# Patient Record
Sex: MALE | Race: WHITE | NOT HISPANIC OR LATINO | URBAN - METROPOLITAN AREA
[De-identification: names, ages, dates, MRNs, and addresses within clinical notes are randomized per-mention and may not be internally consistent; named-entity substitution may affect disease eponyms.]

---

## 2017-02-19 ENCOUNTER — CHARTING TRANS (OUTPATIENT)
Dept: URGENT CARE | Age: 32
End: 2017-02-19

## 2017-02-19 ENCOUNTER — MYAURORA ACCOUNT LINK (OUTPATIENT)
Dept: OTHER | Age: 32
End: 2017-02-19

## 2017-02-19 ASSESSMENT — PAIN SCALES - GENERAL: PAINLEVEL_OUTOF10: 9

## 2018-11-23 ENCOUNTER — IMAGING SERVICES (OUTPATIENT)
Dept: OTHER | Age: 33
End: 2018-11-23

## 2018-11-29 VITALS
SYSTOLIC BLOOD PRESSURE: 122 MMHG | DIASTOLIC BLOOD PRESSURE: 76 MMHG | HEART RATE: 96 BPM | RESPIRATION RATE: 16 BRPM | TEMPERATURE: 97.7 F

## 2019-01-16 ENCOUNTER — OFFICE VISIT (OUTPATIENT)
Dept: SURGERY | Facility: CLINIC | Age: 34
End: 2019-01-16
Payer: COMMERCIAL

## 2019-01-16 VITALS
WEIGHT: 168 LBS | SYSTOLIC BLOOD PRESSURE: 109 MMHG | DIASTOLIC BLOOD PRESSURE: 71 MMHG | HEIGHT: 67 IN | BODY MASS INDEX: 26.37 KG/M2 | HEART RATE: 99 BPM

## 2019-01-16 DIAGNOSIS — Z31.69 INFERTILITY COUNSELING: Primary | ICD-10-CM

## 2019-01-16 PROCEDURE — 99204 OFFICE O/P NEW MOD 45 MIN: CPT | Performed by: UROLOGY

## 2019-01-16 PROCEDURE — 99212 OFFICE O/P EST SF 10 MIN: CPT | Performed by: UROLOGY

## 2019-01-16 NOTE — PROGRESS NOTES
[facility] (formerly [facility]), [facility] Urology  Initial Office Consultation    HPI:   Keith Kleley is a 35year old male here today for further evaluation and to rule out male factor infertility. He is here by himself. He is self-referred.     Patient has been  to hi socially. He works as a  with [email or web address] He denies any substance abuse. Family Hx: Patient denies any family history of prostate cancer or urological malignancy. Both his parents are alive and healthy.     Past Medical History:   Diagnosis Right testis shows no mass and no tenderness. Left testis shows no mass and no tenderness. Circumcised. No hypospadias. Penis exhibits no lesions. Genitourinary Comments: Normal male pubic hair distribution. Meatus orthotopic.  Bilateral testicles descen

## 2019-03-25 ENCOUNTER — TELEPHONE (OUTPATIENT)
Dept: SURGERY | Facility: CLINIC | Age: 34
End: 2019-03-25

## 2019-04-20 ENCOUNTER — HOSPITAL ENCOUNTER (OUTPATIENT)
Age: 34
Discharge: HOME | End: 2019-04-20
Payer: COMMERCIAL

## 2019-04-20 VITALS
BODY MASS INDEX: 25.71 KG/M2 | SYSTOLIC BLOOD PRESSURE: 107 MMHG | TEMPERATURE: 98 F | HEART RATE: 85 BPM | WEIGHT: 160 LBS | RESPIRATION RATE: 18 BRPM | OXYGEN SATURATION: 98 % | DIASTOLIC BLOOD PRESSURE: 72 MMHG | HEIGHT: 66 IN

## 2019-04-20 DIAGNOSIS — M10.9 ACUTE GOUT INVOLVING TOE OF LEFT FOOT, UNSPECIFIED CAUSE: Primary | ICD-10-CM

## 2019-04-20 PROCEDURE — 99214 OFFICE O/P EST MOD 30 MIN: CPT

## 2019-04-20 PROCEDURE — 99213 OFFICE O/P EST LOW 20 MIN: CPT

## 2019-04-20 RX ORDER — METHYLPREDNISOLONE 4 MG/1
TABLET ORAL
Qty: 1 PACKAGE | Refills: 0 | Status: SHIPPED | OUTPATIENT
Start: 2019-04-20 | End: 2019-04-25

## 2019-04-20 NOTE — ED PROVIDER NOTES
Patient presents with:  Swelling Edema (cardiovascular, metabolic)      HPI:     Alexa Nieto is a 35year old male who presents today with a chief complaint of pain in the base of the left great toe that he woke up with this morning.   He states he has Not on file        Attends meetings of clubs or organizations: Not on file        Relationship status: Not on file      Intimate partner violence:        Fear of current or ex partner: Not on file        Emotionally abused: Not on file        Physically ab primary care doctor. Diagnosis:    ICD-10-CM    1. Acute gout involving toe of left foot, unspecified cause M10.9        All results reviewed and discussed with patient. See AVS for detailed discharge instructions for your condition today.     Follow Up

## 2019-11-16 ENCOUNTER — APPOINTMENT (OUTPATIENT)
Dept: LAB | Facility: HOSPITAL | Age: 34
End: 2019-11-16
Attending: UROLOGY
Payer: COMMERCIAL

## 2019-11-16 DIAGNOSIS — Z31.69 INFERTILITY COUNSELING: ICD-10-CM

## 2019-11-16 PROCEDURE — 89320 SEMEN ANAL VOL/COUNT/MOT: CPT

## 2019-11-25 ENCOUNTER — PATIENT MESSAGE (OUTPATIENT)
Dept: SURGERY | Facility: CLINIC | Age: 34
End: 2019-11-25

## 2019-11-26 NOTE — TELEPHONE ENCOUNTER
From: Serafin Clemens  To: Arsh Ramos MD  Sent: 11/25/2019 8:08 AM CST  Subject: Test Results Question    I was wondering if you could fax the test results and findings to the fertility clinic that me and my wife have set up for December 3rd, it is

## 2020-01-07 ENCOUNTER — WALK IN (OUTPATIENT)
Dept: URGENT CARE | Age: 35
End: 2020-01-07

## 2020-01-07 DIAGNOSIS — M10.9 ACUTE GOUT OF RIGHT FOOT, UNSPECIFIED CAUSE: Primary | ICD-10-CM

## 2020-01-07 PROCEDURE — 99213 OFFICE O/P EST LOW 20 MIN: CPT | Performed by: FAMILY MEDICINE

## 2020-01-07 RX ORDER — DIPHENHYDRAMINE HCL 25 MG
TABLET ORAL
COMMUNITY

## 2020-01-07 RX ORDER — PREDNISONE 20 MG/1
40 TABLET ORAL DAILY
Qty: 10 TABLET | Refills: 0 | Status: SHIPPED | OUTPATIENT
Start: 2020-01-07 | End: 2020-01-12

## 2020-01-07 ASSESSMENT — PAIN SCALES - GENERAL: PAINLEVEL: 5-6

## 2020-01-11 ENCOUNTER — HOSPITAL (OUTPATIENT)
Dept: OTHER | Age: 35
End: 2020-01-11

## 2020-02-03 ENCOUNTER — HOSPITAL (OUTPATIENT)
Dept: OTHER | Age: 35
End: 2020-02-03
Attending: ORTHOPAEDIC SURGERY

## 2020-02-03 LAB
ANALYZER ANC (IANC): NORMAL
ANION GAP SERPL CALC-SCNC: 7 MMOL/L (ref 10–20)
APTT PPP: 31 SEC (ref 22–32)
APTT PPP: NORMAL S
APTT PPP: NORMAL S
BUN SERPL-MCNC: 13 MG/DL (ref 6–20)
BUN/CREAT SERPL: 11 (ref 7–25)
CALCIUM SERPL-MCNC: 9.2 MG/DL (ref 8.4–10.2)
CHLORIDE SERPL-SCNC: 107 MMOL/L (ref 98–107)
CO2 SERPL-SCNC: 28 MMOL/L (ref 21–32)
CREAT SERPL-MCNC: 1.21 MG/DL (ref 0.67–1.17)
ERYTHROCYTE [DISTWIDTH] IN BLOOD: 13.2 % (ref 11–15)
GLUCOSE SERPL-MCNC: 85 MG/DL (ref 65–99)
HCT VFR BLD CALC: 42.1 % (ref 39–51)
HGB BLD-MCNC: 13.8 G/DL (ref 13–17)
INR PPP: 1
INR PPP: NORMAL
LENGTH OF FAST TIME PATIENT: 4 HRS
MCH RBC QN AUTO: 29.4 PG (ref 26–34)
MCHC RBC AUTO-ENTMCNC: 32.8 G/DL (ref 32–36.5)
MCV RBC AUTO: 89.8 FL (ref 78–100)
NRBC (NRBCRE): 0 /100 WBC
PLATELET # BLD: 178 K/MCL (ref 140–450)
POTASSIUM SERPL-SCNC: 4 MMOL/L (ref 3.4–5.1)
PROTHROMBIN TIME (PRT2): NORMAL
PROTHROMBIN TIME: 10.1 SEC (ref 9.7–11.8)
RBC # BLD: 4.69 MIL/MCL (ref 4.5–5.9)
SODIUM SERPL-SCNC: 138 MMOL/L (ref 135–145)
WBC # BLD: 4.7 K/MCL (ref 4.2–11)

## 2020-11-05 ENCOUNTER — PATIENT MESSAGE (OUTPATIENT)
Dept: SURGERY | Facility: CLINIC | Age: 35
End: 2020-11-05

## 2020-11-05 DIAGNOSIS — N46.9 INFERTILITY MALE: Primary | ICD-10-CM

## 2020-11-07 NOTE — TELEPHONE ENCOUNTER
From: Sha Casey  To: Marcy Malik MD  Sent: 11/5/2020 7:17 PM CST  Subject: Other    I was wondering if I could get in sooner, I need a new analysis at an earlier date?  Our fertility center needs it asap, it doesn't matter if it is with someone

## 2020-11-20 ENCOUNTER — OFFICE VISIT (OUTPATIENT)
Dept: INTERNAL MEDICINE CLINIC | Facility: CLINIC | Age: 35
End: 2020-11-20
Payer: COMMERCIAL

## 2020-11-20 VITALS
SYSTOLIC BLOOD PRESSURE: 128 MMHG | DIASTOLIC BLOOD PRESSURE: 70 MMHG | BODY MASS INDEX: 26.43 KG/M2 | HEIGHT: 67.48 IN | WEIGHT: 170.38 LBS | HEART RATE: 86 BPM | TEMPERATURE: 99 F | RESPIRATION RATE: 16 BRPM

## 2020-11-20 DIAGNOSIS — E55.9 VITAMIN D DEFICIENCY: ICD-10-CM

## 2020-11-20 DIAGNOSIS — Z13.228 SCREENING FOR METABOLIC DISORDER: ICD-10-CM

## 2020-11-20 DIAGNOSIS — Z00.00 ANNUAL PHYSICAL EXAM: Primary | ICD-10-CM

## 2020-11-20 DIAGNOSIS — Z13.0 SCREENING FOR BLOOD DISEASE: ICD-10-CM

## 2020-11-20 DIAGNOSIS — Z13.29 THYROID DISORDER SCREENING: ICD-10-CM

## 2020-11-20 DIAGNOSIS — Z13.220 SCREENING FOR LIPID DISORDERS: ICD-10-CM

## 2020-11-20 PROCEDURE — 3008F BODY MASS INDEX DOCD: CPT | Performed by: INTERNAL MEDICINE

## 2020-11-20 PROCEDURE — 3078F DIAST BP <80 MM HG: CPT | Performed by: INTERNAL MEDICINE

## 2020-11-20 PROCEDURE — 3074F SYST BP LT 130 MM HG: CPT | Performed by: INTERNAL MEDICINE

## 2020-11-20 PROCEDURE — 99072 ADDL SUPL MATRL&STAF TM PHE: CPT | Performed by: INTERNAL MEDICINE

## 2020-11-20 PROCEDURE — 99385 PREV VISIT NEW AGE 18-39: CPT | Performed by: INTERNAL MEDICINE

## 2020-11-20 RX ORDER — NAPROXEN SODIUM 220 MG
TABLET ORAL
COMMUNITY
Start: 2019-12-01

## 2020-11-20 NOTE — PROGRESS NOTES
Rafael Grady  4/21/1985    Patient presents with:  Establish Care: BM RM 7   Other: Patient would like to go back on ADHD medication      HPI:   Rafael Rasmussen is a 28year old male who presents for an annual physical examination.     The patient has abdominal pain  NEURO: denies headaches    EXAM:   /70   Pulse 86   Temp 98.7 °F (37.1 °C)   Resp 16   Ht 67.48\"   Wt 170 lb 6.4 oz (77.3 kg)   BMI 26.31 kg/m²   GENERAL: Well developed, well nourished,in no apparent distress  SKIN: No rashes,no dinah

## 2021-01-05 ENCOUNTER — NURSE ONLY (OUTPATIENT)
Dept: LAB | Facility: HOSPITAL | Age: 36
End: 2021-01-05
Attending: UROLOGY
Payer: COMMERCIAL

## 2021-01-05 DIAGNOSIS — Z13.220 SCREENING FOR LIPID DISORDERS: ICD-10-CM

## 2021-01-05 DIAGNOSIS — Z13.228 SCREENING FOR METABOLIC DISORDER: ICD-10-CM

## 2021-01-05 DIAGNOSIS — E55.9 VITAMIN D DEFICIENCY: ICD-10-CM

## 2021-01-05 DIAGNOSIS — Z00.00 ANNUAL PHYSICAL EXAM: ICD-10-CM

## 2021-01-05 DIAGNOSIS — N46.9 INFERTILITY MALE: ICD-10-CM

## 2021-01-05 DIAGNOSIS — R73.01 ELEVATED FASTING BLOOD SUGAR: ICD-10-CM

## 2021-01-05 DIAGNOSIS — Z13.29 THYROID DISORDER SCREENING: ICD-10-CM

## 2021-01-05 DIAGNOSIS — Z13.0 SCREENING FOR BLOOD DISEASE: ICD-10-CM

## 2021-01-05 LAB
ALBUMIN SERPL-MCNC: 4.1 G/DL (ref 3.4–5)
ALBUMIN/GLOB SERPL: 1.1 {RATIO} (ref 1–2)
ALP LIVER SERPL-CCNC: 70 U/L
ALT SERPL-CCNC: 38 U/L
ANION GAP SERPL CALC-SCNC: 5 MMOL/L (ref 0–18)
AST SERPL-CCNC: 20 U/L (ref 15–37)
BASOPHILS # BLD AUTO: 0.05 X10(3) UL (ref 0–0.2)
BASOPHILS NFR BLD AUTO: 0.9 %
BILIRUB SERPL-MCNC: 0.4 MG/DL (ref 0.1–2)
BUN BLD-MCNC: 17 MG/DL (ref 7–18)
BUN/CREAT SERPL: 12.6 (ref 10–20)
CALCIUM BLD-MCNC: 9.5 MG/DL (ref 8.5–10.1)
CHLORIDE SERPL-SCNC: 107 MMOL/L (ref 98–112)
CHOLEST SMN-MCNC: 199 MG/DL (ref ?–200)
CO2 SERPL-SCNC: 27 MMOL/L (ref 21–32)
CREAT BLD-MCNC: 1.35 MG/DL
DEPRECATED RDW RBC AUTO: 43.3 FL (ref 35.1–46.3)
EOSINOPHIL # BLD AUTO: 0.24 X10(3) UL (ref 0–0.7)
EOSINOPHIL NFR BLD AUTO: 4.1 %
ERYTHROCYTE [DISTWIDTH] IN BLOOD BY AUTOMATED COUNT: 13.2 % (ref 11–15)
EST. AVERAGE GLUCOSE BLD GHB EST-MCNC: 103 MG/DL (ref 68–126)
GLOBULIN PLAS-MCNC: 3.6 G/DL (ref 2.8–4.4)
GLUCOSE BLD-MCNC: 119 MG/DL (ref 70–99)
HBA1C MFR BLD HPLC: 5.2 % (ref ?–5.7)
HCT VFR BLD AUTO: 45.1 %
HDLC SERPL-MCNC: 40 MG/DL (ref 40–59)
HGB BLD-MCNC: 15.1 G/DL
IMM GRANULOCYTES # BLD AUTO: 0.01 X10(3) UL (ref 0–1)
IMM GRANULOCYTES NFR BLD: 0.2 %
LDLC SERPL CALC-MCNC: 132 MG/DL (ref ?–100)
LYMPHOCYTES # BLD AUTO: 1.46 X10(3) UL (ref 1–4)
LYMPHOCYTES NFR BLD AUTO: 24.8 %
M PROTEIN MFR SERPL ELPH: 7.7 G/DL (ref 6.4–8.2)
MCH RBC QN AUTO: 29.9 PG (ref 26–34)
MCHC RBC AUTO-ENTMCNC: 33.5 G/DL (ref 31–37)
MCV RBC AUTO: 89.3 FL
MONOCYTES # BLD AUTO: 0.65 X10(3) UL (ref 0.1–1)
MONOCYTES NFR BLD AUTO: 11.1 %
NEUTROPHILS # BLD AUTO: 3.47 X10 (3) UL (ref 1.5–7.7)
NEUTROPHILS # BLD AUTO: 3.47 X10(3) UL (ref 1.5–7.7)
NEUTROPHILS NFR BLD AUTO: 58.9 %
NONHDLC SERPL-MCNC: 159 MG/DL (ref ?–130)
OSMOLALITY SERPL CALC.SUM OF ELEC: 291 MOSM/KG (ref 275–295)
PATIENT FASTING Y/N/NP: YES
PATIENT FASTING Y/N/NP: YES
PH SMN: 7.2 [PH] (ref 7.2–8.3)
PLATELET # BLD AUTO: 168 10(3)UL (ref 150–450)
POTASSIUM SERPL-SCNC: 4 MMOL/L (ref 3.5–5.1)
RBC # BLD AUTO: 5.05 X10(6)UL
SODIUM SERPL-SCNC: 139 MMOL/L (ref 136–145)
SPECIMEN VOL SMN: 1.5 ML (ref 1.5–6)
SPERM # SMN: 231.6 MILL/ML (ref 15–150)
SPERM IMMOTILE NFR SMN: 46 %
SPERM IMMOTILE NFR SMN: 6 %
SPERM PROG NFR SMN: 48 %
TRIGL SERPL-MCNC: 133 MG/DL (ref 30–149)
TSI SER-ACNC: 1.42 MIU/ML (ref 0.36–3.74)
VLDLC SERPL CALC-MCNC: 27 MG/DL (ref 0–30)
WBC # BLD AUTO: 5.9 X10(3) UL (ref 4–11)

## 2021-01-05 PROCEDURE — 80061 LIPID PANEL: CPT

## 2021-01-05 PROCEDURE — 84443 ASSAY THYROID STIM HORMONE: CPT

## 2021-01-05 PROCEDURE — 89320 SEMEN ANAL VOL/COUNT/MOT: CPT

## 2021-01-05 PROCEDURE — 80053 COMPREHEN METABOLIC PANEL: CPT

## 2021-01-05 PROCEDURE — 82306 VITAMIN D 25 HYDROXY: CPT

## 2021-01-05 PROCEDURE — 85025 COMPLETE CBC W/AUTO DIFF WBC: CPT

## 2021-01-05 PROCEDURE — 36415 COLL VENOUS BLD VENIPUNCTURE: CPT

## 2021-01-05 PROCEDURE — 83036 HEMOGLOBIN GLYCOSYLATED A1C: CPT

## 2021-01-06 LAB — 25(OH)D3 SERPL-MCNC: 22 NG/ML (ref 30–100)

## 2021-03-26 RX ORDER — ERGOCALCIFEROL 1.25 MG/1
50000 CAPSULE ORAL WEEKLY
Qty: 12 CAPSULE | Refills: 0 | OUTPATIENT
Start: 2021-03-26 | End: 2021-06-12

## 2021-03-26 NOTE — TELEPHONE ENCOUNTER
Last Ov: 11/20/20, AD, CPE  Last labs: CBC, CMP, Lipid, TSH w Ref, Vit D, A1c 1/5/21  Last Rx: ergocalciferol 1.25mg, #12, 0R 1/6/21    No future appointments. Per Protocol - not on protocol, Rx pending.

## 2021-04-17 ENCOUNTER — IMMUNIZATION (OUTPATIENT)
Dept: LAB | Age: 36
End: 2021-04-17
Attending: HOSPITALIST
Payer: COMMERCIAL

## 2021-04-17 DIAGNOSIS — Z23 NEED FOR VACCINATION: Primary | ICD-10-CM

## 2021-04-17 PROCEDURE — 0001A SARSCOV2 VAC 30MCG/0.3ML IM: CPT

## 2021-05-08 ENCOUNTER — IMMUNIZATION (OUTPATIENT)
Dept: LAB | Age: 36
End: 2021-05-08
Attending: FAMILY MEDICINE
Payer: COMMERCIAL

## 2021-05-08 DIAGNOSIS — Z23 NEED FOR VACCINATION: Primary | ICD-10-CM

## 2021-05-08 PROCEDURE — 0002A SARSCOV2 VAC 30MCG/0.3ML IM: CPT

## 2021-05-25 VITALS
RESPIRATION RATE: 16 BRPM | HEART RATE: 78 BPM | SYSTOLIC BLOOD PRESSURE: 125 MMHG | OXYGEN SATURATION: 97 % | DIASTOLIC BLOOD PRESSURE: 73 MMHG | TEMPERATURE: 98.4 F

## 2022-01-09 ENCOUNTER — IMMUNIZATION (OUTPATIENT)
Dept: LAB | Facility: HOSPITAL | Age: 37
End: 2022-01-09
Attending: EMERGENCY MEDICINE
Payer: COMMERCIAL

## 2022-01-09 DIAGNOSIS — Z23 NEED FOR VACCINATION: Primary | ICD-10-CM

## 2022-01-09 PROCEDURE — 0054A SARSCOV2 VAC 30MCG/0.3ML IM: CPT

## 2022-01-09 PROCEDURE — 0004A SARSCOV2 VAC 30MCG/0.3ML IM: CPT

## 2022-01-24 ENCOUNTER — OFFICE VISIT (OUTPATIENT)
Dept: SURGERY | Facility: CLINIC | Age: 37
End: 2022-01-24
Payer: COMMERCIAL

## 2022-01-24 DIAGNOSIS — Z31.69 INFERTILITY COUNSELING: ICD-10-CM

## 2022-01-24 DIAGNOSIS — Z78.9 ATTEMPTING TO CONCEIVE: Primary | ICD-10-CM

## 2022-01-24 PROCEDURE — 99203 OFFICE O/P NEW LOW 30 MIN: CPT | Performed by: UROLOGY

## 2022-01-24 NOTE — PROGRESS NOTES
[facility], [facility] Urology  Initial Office Consultation    HPI:   Duane Finer is a 39year old male here today for consultation at the request of, and a copy of this note will be sent to, Ximena Smith MD.    Patient was previously seen by me on 1/16/2019 Problems Paternal Grandfather      Allergies: Penicillins      REVIEW OF SYSTEMS:  Pertinent positives and negatives per HPI. A 10-point ROS was performed and is otherwise negative. EXAM:  There were no vitals taken for this visit.     Physical Exam was evaluated utilizing the [facility] Classification. The normal morphology lower reference limit by this classification is 4%.      Component      Latest Ref Rng & Units 11/16/2019   Semen Volume     1.5 - 6.0 mL 2.5   SEMEN PH     7.2 - 8.3 7.2   Semen Liquefac

## 2022-02-18 PROBLEM — F90.2 ATTENTION DEFICIT HYPERACTIVITY DISORDER (ADHD), COMBINED TYPE: Status: ACTIVE | Noted: 2022-02-18

## 2022-06-14 PROBLEM — F40.10 SOCIAL ANXIETY DISORDER: Status: ACTIVE | Noted: 2022-06-14

## 2022-07-05 ENCOUNTER — OFFICE VISIT (OUTPATIENT)
Dept: INTERNAL MEDICINE CLINIC | Facility: CLINIC | Age: 37
End: 2022-07-05
Payer: COMMERCIAL

## 2022-07-05 VITALS
DIASTOLIC BLOOD PRESSURE: 68 MMHG | TEMPERATURE: 97 F | OXYGEN SATURATION: 96 % | HEIGHT: 67 IN | HEART RATE: 64 BPM | SYSTOLIC BLOOD PRESSURE: 114 MMHG | RESPIRATION RATE: 16 BRPM | BODY MASS INDEX: 25.11 KG/M2 | WEIGHT: 160 LBS

## 2022-07-05 DIAGNOSIS — M54.2 CERVICALGIA: ICD-10-CM

## 2022-07-05 DIAGNOSIS — Z00.00 LABORATORY EXAMINATION ORDERED AS PART OF A COMPLETE PHYSICAL EXAMINATION: ICD-10-CM

## 2022-07-05 DIAGNOSIS — M67.431 GANGLION CYST OF WRIST, RIGHT: ICD-10-CM

## 2022-07-05 DIAGNOSIS — R53.82 CHRONIC FATIGUE: ICD-10-CM

## 2022-07-05 DIAGNOSIS — Z13.0 SCREENING FOR BLOOD DISEASE: ICD-10-CM

## 2022-07-05 DIAGNOSIS — M25.50 ARTHRALGIA OF MULTIPLE JOINTS: ICD-10-CM

## 2022-07-05 DIAGNOSIS — J45.909 UNCOMPLICATED ASTHMA, UNSPECIFIED ASTHMA SEVERITY, UNSPECIFIED WHETHER PERSISTENT: Primary | ICD-10-CM

## 2022-07-05 DIAGNOSIS — Z13.228 SCREENING FOR METABOLIC DISORDER: ICD-10-CM

## 2022-07-05 DIAGNOSIS — Z13.220 SCREENING FOR LIPID DISORDERS: ICD-10-CM

## 2022-07-05 DIAGNOSIS — Z13.29 SCREENING FOR THYROID DISORDER: ICD-10-CM

## 2022-07-05 PROCEDURE — 3008F BODY MASS INDEX DOCD: CPT | Performed by: INTERNAL MEDICINE

## 2022-07-05 PROCEDURE — 99215 OFFICE O/P EST HI 40 MIN: CPT | Performed by: INTERNAL MEDICINE

## 2022-07-05 PROCEDURE — 3074F SYST BP LT 130 MM HG: CPT | Performed by: INTERNAL MEDICINE

## 2022-07-05 PROCEDURE — 3078F DIAST BP <80 MM HG: CPT | Performed by: INTERNAL MEDICINE

## 2022-07-05 RX ORDER — ALBUTEROL SULFATE 90 UG/1
2 AEROSOL, METERED RESPIRATORY (INHALATION) EVERY 6 HOURS PRN
Qty: 1 EACH | Refills: 1 | Status: SHIPPED | OUTPATIENT
Start: 2022-07-05 | End: 2022-07-11

## 2022-07-05 RX ORDER — FLUTICASONE PROPIONATE 110 UG/1
1 AEROSOL, METERED RESPIRATORY (INHALATION) 2 TIMES DAILY
Qty: 1 EACH | Refills: 0 | Status: SHIPPED | OUTPATIENT
Start: 2022-07-05 | End: 2022-07-11

## 2022-07-08 ENCOUNTER — PATIENT MESSAGE (OUTPATIENT)
Dept: INTERNAL MEDICINE CLINIC | Facility: CLINIC | Age: 37
End: 2022-07-08

## 2022-07-11 RX ORDER — DEXTROAMPHETAMINE SACCHARATE, AMPHETAMINE ASPARTATE, DEXTROAMPHETAMINE SULFATE AND AMPHETAMINE SULFATE 2.5; 2.5; 2.5; 2.5 MG/1; MG/1; MG/1; MG/1
10 TABLET ORAL DAILY
Qty: 30 TABLET | Refills: 0 | Status: SHIPPED | OUTPATIENT
Start: 2022-07-11 | End: 2022-08-10

## 2022-07-11 RX ORDER — FLUTICASONE PROPIONATE 110 UG/1
1 AEROSOL, METERED RESPIRATORY (INHALATION) 2 TIMES DAILY
Qty: 1 EACH | Refills: 0 | Status: SHIPPED | OUTPATIENT
Start: 2022-07-11 | End: 2023-07-06

## 2022-07-11 RX ORDER — DEXTROAMPHETAMINE SACCHARATE, AMPHETAMINE ASPARTATE MONOHYDRATE, DEXTROAMPHETAMINE SULFATE AND AMPHETAMINE SULFATE 6.25; 6.25; 6.25; 6.25 MG/1; MG/1; MG/1; MG/1
25 CAPSULE, EXTENDED RELEASE ORAL DAILY
Qty: 30 CAPSULE | Refills: 0 | Status: SHIPPED | OUTPATIENT
Start: 2022-07-19 | End: 2022-08-19

## 2022-07-11 RX ORDER — ALBUTEROL SULFATE 90 UG/1
2 AEROSOL, METERED RESPIRATORY (INHALATION) EVERY 6 HOURS PRN
Qty: 1 EACH | Refills: 1 | Status: SHIPPED | OUTPATIENT
Start: 2022-07-11

## 2022-07-11 NOTE — TELEPHONE ENCOUNTER
From: Viral Daily  To: Nghia Delgado MD  Sent: 7/8/2022 4:05 PM CDT  Subject: Refill    I think the person at [facility] made a mistake I wasn't trying to fill or refill the BUS. Just the ADHD and asthma medications. Thank you!

## 2022-07-11 NOTE — TELEPHONE ENCOUNTER
Last VISIT 07/05/22    Last CPE 11/20/20    Last REFILL 06/23/22   amphetamine-dextroamphetamine (ADDERALL) 10 MG Oral Tab 30 tablet 0     Amphetamine-Dextroamphet ER (ADDERALL XR) 25 MG Oral Capsule SR 24 Hr 30 capsule 0   07/05/22  fluticasone propionate (FLOVENT HFA) 110 MCG/ACT Inhalation Aerosol 1 each 0     albuterol 108 (90 Base) MCG/ACT Inhalation Aero Soln 1 each 1     Last LABS 01/05/21 Multiple labs done    No Future Appointments      Per PROTOCOL? Failed     Please Approve or Deny.

## 2022-08-03 PROBLEM — F43.21 GRIEF AT LOSS OF CHILD: Status: ACTIVE | Noted: 2022-08-03

## 2022-08-03 PROBLEM — Z63.4 GRIEF AT LOSS OF CHILD: Status: ACTIVE | Noted: 2022-08-03

## 2022-08-11 ENCOUNTER — HOSPITAL ENCOUNTER (OUTPATIENT)
Dept: GENERAL RADIOLOGY | Age: 37
Discharge: HOME | End: 2022-08-11
Attending: INTERNAL MEDICINE
Payer: COMMERCIAL

## 2022-08-11 DIAGNOSIS — M54.2 CERVICALGIA: ICD-10-CM

## 2022-08-11 PROCEDURE — 72040 X-RAY EXAM NECK SPINE 2-3 VW: CPT | Performed by: INTERNAL MEDICINE

## 2022-08-13 ENCOUNTER — LAB ENCOUNTER (OUTPATIENT)
Dept: LAB | Age: 37
End: 2022-08-13
Attending: INTERNAL MEDICINE
Payer: COMMERCIAL

## 2022-08-13 DIAGNOSIS — Z13.0 SCREENING FOR BLOOD DISEASE: ICD-10-CM

## 2022-08-13 DIAGNOSIS — M54.2 CERVICALGIA: ICD-10-CM

## 2022-08-13 DIAGNOSIS — Z00.00 LABORATORY EXAMINATION ORDERED AS PART OF A COMPLETE PHYSICAL EXAMINATION: ICD-10-CM

## 2022-08-13 DIAGNOSIS — Z13.228 SCREENING FOR METABOLIC DISORDER: ICD-10-CM

## 2022-08-13 DIAGNOSIS — Z13.220 SCREENING FOR LIPID DISORDERS: ICD-10-CM

## 2022-08-13 DIAGNOSIS — R53.82 CHRONIC FATIGUE: ICD-10-CM

## 2022-08-13 DIAGNOSIS — M25.50 ARTHRALGIA OF MULTIPLE JOINTS: ICD-10-CM

## 2022-08-13 DIAGNOSIS — Z13.29 SCREENING FOR THYROID DISORDER: ICD-10-CM

## 2022-08-13 LAB
ALBUMIN SERPL-MCNC: 4.3 G/DL (ref 3.4–5)
ALBUMIN/GLOB SERPL: 1.3 {RATIO} (ref 1–2)
ALP LIVER SERPL-CCNC: 72 U/L
ALT SERPL-CCNC: 45 U/L
ANION GAP SERPL CALC-SCNC: 6 MMOL/L (ref 0–18)
AST SERPL-CCNC: 28 U/L (ref 15–37)
BASOPHILS # BLD AUTO: 0.06 X10(3) UL (ref 0–0.2)
BASOPHILS NFR BLD AUTO: 1 %
BILIRUB SERPL-MCNC: 0.3 MG/DL (ref 0.1–2)
BUN BLD-MCNC: 17 MG/DL (ref 7–18)
BUN/CREAT SERPL: 12.5 (ref 10–20)
CALCIUM BLD-MCNC: 9.7 MG/DL (ref 8.5–10.1)
CHLORIDE SERPL-SCNC: 109 MMOL/L (ref 98–112)
CHOLEST SERPL-MCNC: 172 MG/DL (ref ?–200)
CO2 SERPL-SCNC: 25 MMOL/L (ref 21–32)
CREAT BLD-MCNC: 1.36 MG/DL
CRP SERPL-MCNC: <0.29 MG/DL (ref ?–0.3)
DEPRECATED RDW RBC AUTO: 48 FL (ref 35.1–46.3)
EOSINOPHIL # BLD AUTO: 0.14 X10(3) UL (ref 0–0.7)
EOSINOPHIL NFR BLD AUTO: 2.3 %
ERYTHROCYTE [DISTWIDTH] IN BLOOD BY AUTOMATED COUNT: 13.9 % (ref 11–15)
ERYTHROCYTE [SEDIMENTATION RATE] IN BLOOD: 9 MM/HR
FASTING PATIENT LIPID ANSWER: YES
FASTING STATUS PATIENT QL REPORTED: YES
GFR SERPLBLD BASED ON 1.73 SQ M-ARVRAT: 69 ML/MIN/1.73M2 (ref 60–?)
GLOBULIN PLAS-MCNC: 3.4 G/DL (ref 2.8–4.4)
GLUCOSE BLD-MCNC: 115 MG/DL (ref 70–99)
HCT VFR BLD AUTO: 45.2 %
HDLC SERPL-MCNC: 47 MG/DL (ref 40–59)
HGB BLD-MCNC: 14.6 G/DL
IMM GRANULOCYTES # BLD AUTO: 0.01 X10(3) UL (ref 0–1)
IMM GRANULOCYTES NFR BLD: 0.2 %
LDLC SERPL CALC-MCNC: 115 MG/DL (ref ?–100)
LYMPHOCYTES # BLD AUTO: 1.06 X10(3) UL (ref 1–4)
LYMPHOCYTES NFR BLD AUTO: 17.6 %
MCH RBC QN AUTO: 29.9 PG (ref 26–34)
MCHC RBC AUTO-ENTMCNC: 32.3 G/DL (ref 31–37)
MCV RBC AUTO: 92.4 FL
MONOCYTES # BLD AUTO: 0.44 X10(3) UL (ref 0.1–1)
MONOCYTES NFR BLD AUTO: 7.3 %
NEUTROPHILS # BLD AUTO: 4.3 X10 (3) UL (ref 1.5–7.7)
NEUTROPHILS # BLD AUTO: 4.3 X10(3) UL (ref 1.5–7.7)
NEUTROPHILS NFR BLD AUTO: 71.6 %
NONHDLC SERPL-MCNC: 125 MG/DL (ref ?–130)
OSMOLALITY SERPL CALC.SUM OF ELEC: 292 MOSM/KG (ref 275–295)
PLATELET # BLD AUTO: 203 10(3)UL (ref 150–450)
POTASSIUM SERPL-SCNC: 4.4 MMOL/L (ref 3.5–5.1)
PROT SERPL-MCNC: 7.7 G/DL (ref 6.4–8.2)
RBC # BLD AUTO: 4.89 X10(6)UL
SODIUM SERPL-SCNC: 140 MMOL/L (ref 136–145)
TESTOST SERPL-MCNC: 514.17 NG/DL
TRIGL SERPL-MCNC: 47 MG/DL (ref 30–149)
TSI SER-ACNC: 0.67 MIU/ML (ref 0.36–3.74)
VLDLC SERPL CALC-MCNC: 8 MG/DL (ref 0–30)
WBC # BLD AUTO: 6 X10(3) UL (ref 4–11)

## 2022-08-13 PROCEDURE — 80053 COMPREHEN METABOLIC PANEL: CPT

## 2022-08-13 PROCEDURE — 36415 COLL VENOUS BLD VENIPUNCTURE: CPT

## 2022-08-13 PROCEDURE — 86140 C-REACTIVE PROTEIN: CPT

## 2022-08-13 PROCEDURE — 84443 ASSAY THYROID STIM HORMONE: CPT

## 2022-08-13 PROCEDURE — 85025 COMPLETE CBC W/AUTO DIFF WBC: CPT

## 2022-08-13 PROCEDURE — 84403 ASSAY OF TOTAL TESTOSTERONE: CPT

## 2022-08-13 PROCEDURE — 80061 LIPID PANEL: CPT

## 2022-08-13 PROCEDURE — 85652 RBC SED RATE AUTOMATED: CPT

## 2022-08-24 ENCOUNTER — HOSPITAL ENCOUNTER (OUTPATIENT)
Dept: MRI IMAGING | Age: 37
Discharge: HOME | End: 2022-08-24
Attending: INTERNAL MEDICINE
Payer: COMMERCIAL

## 2022-08-24 DIAGNOSIS — M43.12 SPONDYLOLISTHESIS OF CERVICAL REGION: ICD-10-CM

## 2022-08-24 DIAGNOSIS — M54.2 CERVICALGIA: ICD-10-CM

## 2022-08-24 DIAGNOSIS — M47.812 FACET ARTHRITIS, DEGENERATIVE, CERVICAL SPINE: ICD-10-CM

## 2022-08-24 PROCEDURE — 72141 MRI NECK SPINE W/O DYE: CPT | Performed by: INTERNAL MEDICINE

## 2022-08-25 ENCOUNTER — PATIENT MESSAGE (OUTPATIENT)
Dept: INTERNAL MEDICINE CLINIC | Facility: CLINIC | Age: 37
End: 2022-08-25

## 2022-08-26 NOTE — TELEPHONE ENCOUNTER
From: Nitza Raya  To: Lajuan Galeazzi, MD  Sent: 8/25/2022 4:19 PM CDT  Subject: Question regarding MRI Scan Spine    What does all of that mean? Doesn't seem too serious that would cause consistent pain. Let me know when youbget a chance to review it. I am off work tomorrow (Friday 8/26) so I am available all day.      Thank you,  Héctor Doherty

## 2022-09-01 RX ORDER — DEXAMETHASONE 4 MG/1
TABLET ORAL
Qty: 12 EACH | Refills: 0 | Status: SHIPPED | OUTPATIENT
Start: 2022-09-01

## 2022-09-01 NOTE — TELEPHONE ENCOUNTER
Last VISIT 07/05/22    Last CPE 11/20/20    Last REFILL 07/11/22 qty 1 w/0 refills    Last LABS 08/13/22 Multiple labs done    No Future Appointments       Per PROTOCOL? Failed       Please Approve or Deny.

## 2022-09-02 ENCOUNTER — OFFICE VISIT (OUTPATIENT)
Dept: SLEEP CENTER | Age: 37
End: 2022-09-02
Attending: INTERNAL MEDICINE
Payer: COMMERCIAL

## 2022-09-02 DIAGNOSIS — R53.82 CHRONIC FATIGUE: ICD-10-CM

## 2022-09-02 PROCEDURE — 95806 SLEEP STUDY UNATT&RESP EFFT: CPT

## 2022-09-14 ENCOUNTER — OFFICE VISIT (OUTPATIENT)
Dept: SURGERY | Facility: CLINIC | Age: 37
End: 2022-09-14
Payer: COMMERCIAL

## 2022-09-14 VITALS
BODY MASS INDEX: 24.48 KG/M2 | WEIGHT: 156 LBS | HEIGHT: 67 IN | SYSTOLIC BLOOD PRESSURE: 110 MMHG | DIASTOLIC BLOOD PRESSURE: 70 MMHG | HEART RATE: 100 BPM

## 2022-09-14 DIAGNOSIS — M54.16 LUMBAR RADICULITIS: ICD-10-CM

## 2022-09-14 DIAGNOSIS — M50.30 DDD (DEGENERATIVE DISC DISEASE), CERVICAL: ICD-10-CM

## 2022-09-14 DIAGNOSIS — M54.12 CERVICAL RADICULITIS: ICD-10-CM

## 2022-09-14 DIAGNOSIS — R20.2 NUMBNESS AND TINGLING: ICD-10-CM

## 2022-09-14 DIAGNOSIS — R29.898 UPPER EXTREMITY WEAKNESS: ICD-10-CM

## 2022-09-14 DIAGNOSIS — M54.2 NECK PAIN: Primary | ICD-10-CM

## 2022-09-14 DIAGNOSIS — R29.898 WEAKNESS OF BOTH LOWER EXTREMITIES: ICD-10-CM

## 2022-09-14 DIAGNOSIS — R20.0 NUMBNESS AND TINGLING: ICD-10-CM

## 2022-09-14 DIAGNOSIS — M43.12 SPONDYLOLISTHESIS OF CERVICAL REGION: ICD-10-CM

## 2022-09-14 DIAGNOSIS — R52 DIFFUSE PAIN: ICD-10-CM

## 2022-09-14 DIAGNOSIS — M54.50 LUMBAR PAIN: ICD-10-CM

## 2022-09-14 PROCEDURE — 3008F BODY MASS INDEX DOCD: CPT | Performed by: NEUROLOGICAL SURGERY

## 2022-09-14 PROCEDURE — 99204 OFFICE O/P NEW MOD 45 MIN: CPT | Performed by: NEUROLOGICAL SURGERY

## 2022-09-14 PROCEDURE — 3074F SYST BP LT 130 MM HG: CPT | Performed by: NEUROLOGICAL SURGERY

## 2022-09-14 PROCEDURE — 3078F DIAST BP <80 MM HG: CPT | Performed by: NEUROLOGICAL SURGERY

## 2022-09-14 NOTE — PROGRESS NOTES
Neurosurgery staff    Patient seen and examined. Please also see DENNIS Love note for further information. This 49-year-old gentleman has a primary complaint of neck and back pain. Symptoms been ongoing for about 20 years. He has occasional arm and leg pain, as well as tingling. Symptoms are worse in the morning when he gets out of bed, and with physical activity. He gets improvement with rest.  He recently started Cymbalta, which he thinks may be helping. On examination, strength is 5/5. Sensation is intact. Reflexes are 1+ and symmetric. No James's or clonus. MRI of the cervical spine demonstrates mild spondylosis, without spinal cord or nerve root compression. Cervical x-rays demonstrate grade 1 anterolisthesis at C4-5 and C7-T1. The facet joints at C4-5 are not clearly visualized on x-ray. I would like to obtain flexion-extension x-rays, as well as a CT of the cervical spine to better assess this. 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 Referral has been made for rheumatology, as well as physiatry. Return in 6 weeks, to review imaging. 0

## 2022-09-14 NOTE — PROGRESS NOTES
Pt here for neck pain. Pt states he has Numbness and tingling in both arms.  Pt had MRI and Xray      Review of Systems:    Hand Dominance: right  General: no symptoms reported  Neuro: no symptoms reported  Head: no symptoms reported  Musculoskeletal: bone pain, numbness and tingling  Cardiovascular: palpitations  Gastrointestinal: no symptoms reported  Genitourinary: difficulty urinating  Respiratory: shortness of breath  Eyes: no symptoms reported  Skin: no symptoms reported  Mouth & throat: no symptoms reported  Neck: pain and stiffness  Nose: no symptoms reported  Psychiatric: anxiety

## 2022-09-18 ENCOUNTER — HOSPITAL ENCOUNTER (OUTPATIENT)
Dept: GENERAL RADIOLOGY | Age: 37
End: 2022-09-18
Attending: PHYSICIAN ASSISTANT

## 2022-09-18 ENCOUNTER — HOSPITAL ENCOUNTER (OUTPATIENT)
Dept: GENERAL RADIOLOGY | Age: 37
Discharge: HOME | End: 2022-09-18
Attending: PHYSICIAN ASSISTANT

## 2022-09-18 DIAGNOSIS — M54.2 NECK PAIN: ICD-10-CM

## 2022-09-18 DIAGNOSIS — M50.30 DDD (DEGENERATIVE DISC DISEASE), CERVICAL: ICD-10-CM

## 2022-09-18 DIAGNOSIS — M43.12 SPONDYLOLISTHESIS OF CERVICAL REGION: ICD-10-CM

## 2022-09-18 DIAGNOSIS — R29.898 UPPER EXTREMITY WEAKNESS: ICD-10-CM

## 2022-09-18 DIAGNOSIS — M54.12 CERVICAL RADICULITIS: ICD-10-CM

## 2022-09-18 PROCEDURE — 72040 X-RAY EXAM NECK SPINE 2-3 VW: CPT | Performed by: PHYSICIAN ASSISTANT

## 2022-09-19 ENCOUNTER — PATIENT MESSAGE (OUTPATIENT)
Dept: INTERNAL MEDICINE CLINIC | Facility: CLINIC | Age: 37
End: 2022-09-19

## 2022-09-19 DIAGNOSIS — R53.82 CHRONIC FATIGUE: Primary | ICD-10-CM

## 2022-09-20 NOTE — TELEPHONE ENCOUNTER
From: Daisha Akins  To:  Natividad Powers MD  Sent: 9/19/2022 3:36 PM CDT  Subject: Question regarding GENERAL SLEEP STUDY    Now we just have to continue to figure out this chronic fatigue

## 2022-09-25 ENCOUNTER — HOSPITAL ENCOUNTER (OUTPATIENT)
Dept: CT IMAGING | Age: 37
Discharge: HOME | End: 2022-09-25
Attending: PHYSICIAN ASSISTANT

## 2022-09-25 ENCOUNTER — HOSPITAL ENCOUNTER (OUTPATIENT)
Dept: CT IMAGING | Age: 37
End: 2022-09-25
Attending: PHYSICIAN ASSISTANT

## 2022-09-25 DIAGNOSIS — M54.12 CERVICAL RADICULITIS: ICD-10-CM

## 2022-09-25 DIAGNOSIS — M50.30 DDD (DEGENERATIVE DISC DISEASE), CERVICAL: ICD-10-CM

## 2022-09-25 DIAGNOSIS — R29.898 UPPER EXTREMITY WEAKNESS: ICD-10-CM

## 2022-09-25 DIAGNOSIS — M54.2 NECK PAIN: ICD-10-CM

## 2022-09-25 DIAGNOSIS — M43.12 SPONDYLOLISTHESIS OF CERVICAL REGION: ICD-10-CM

## 2022-09-25 PROCEDURE — 72125 CT NECK SPINE W/O DYE: CPT | Performed by: PHYSICIAN ASSISTANT

## 2022-09-30 RX ORDER — FLUTICASONE PROPIONATE 110 UG/1
1 AEROSOL, METERED RESPIRATORY (INHALATION) 2 TIMES DAILY
Qty: 2 EACH | Refills: 3 | Status: SHIPPED | OUTPATIENT
Start: 2022-09-30

## 2022-09-30 NOTE — TELEPHONE ENCOUNTER
Last VISIT 07/05/22    Last CPE 11/20/20    Last REFILL 09/01/22 qty 12 w/0 refills    Last LABS 08/13/22 Multiple labs done    No Future Appointments      Per PROTOCOL? Failed     Please Approve or Deny.

## 2022-10-18 ENCOUNTER — LAB ENCOUNTER (OUTPATIENT)
Dept: LAB | Facility: HOSPITAL | Age: 37
End: 2022-10-18
Attending: INTERNAL MEDICINE
Payer: COMMERCIAL

## 2022-10-18 DIAGNOSIS — Z31.69 INFERTILITY COUNSELING: ICD-10-CM

## 2022-10-18 DIAGNOSIS — R53.82 CHRONIC FATIGUE: Primary | ICD-10-CM

## 2022-10-18 DIAGNOSIS — R79.89 ELEVATED SERUM CREATININE: ICD-10-CM

## 2022-10-18 DIAGNOSIS — Z78.9 ATTEMPTING TO CONCEIVE: ICD-10-CM

## 2022-10-18 LAB
ALBUMIN SERPL-MCNC: 4 G/DL (ref 3.4–5)
ALBUMIN/GLOB SERPL: 1.3 {RATIO} (ref 1–2)
ALP LIVER SERPL-CCNC: 80 U/L
ALT SERPL-CCNC: 67 U/L
ANION GAP SERPL CALC-SCNC: 5 MMOL/L (ref 0–18)
AST SERPL-CCNC: 33 U/L (ref 15–37)
BILIRUB SERPL-MCNC: 0.4 MG/DL (ref 0.1–2)
BUN BLD-MCNC: 14 MG/DL (ref 7–18)
BUN/CREAT SERPL: 10.9 (ref 10–20)
CALCIUM BLD-MCNC: 8.6 MG/DL (ref 8.5–10.1)
CHLORIDE SERPL-SCNC: 110 MMOL/L (ref 98–112)
CO2 SERPL-SCNC: 27 MMOL/L (ref 21–32)
CREAT BLD-MCNC: 1.29 MG/DL
DSDNA IGG SERPL IA-ACNC: 5.5 IU/ML
ENA AB SER QL IA: 0.2 UG/L
ENA AB SER QL IA: NEGATIVE
EST. AVERAGE GLUCOSE BLD GHB EST-MCNC: 108 MG/DL (ref 68–126)
FASTING STATUS PATIENT QL REPORTED: NO
GFR SERPLBLD BASED ON 1.73 SQ M-ARVRAT: 73 ML/MIN/1.73M2 (ref 60–?)
GLOBULIN PLAS-MCNC: 3.1 G/DL (ref 2.8–4.4)
GLUCOSE BLD-MCNC: 112 MG/DL (ref 70–99)
HBA1C MFR BLD: 5.4 % (ref ?–5.7)
OSMOLALITY SERPL CALC.SUM OF ELEC: 295 MOSM/KG (ref 275–295)
PH SMN: 7.5 [PH] (ref 7.2–8.3)
POTASSIUM SERPL-SCNC: 4.9 MMOL/L (ref 3.5–5.1)
PROT SERPL-MCNC: 7.1 G/DL (ref 6.4–8.2)
SODIUM SERPL-SCNC: 142 MMOL/L (ref 136–145)
SPECIMEN VOL SMN: 1 ML (ref 1.5–6)
SPERM # SMN: 280.9 MILL/ML (ref 15–150)
SPERM IMMOTILE NFR SMN: 5 %
SPERM IMMOTILE NFR SMN: 57 %
SPERM PROG NFR SMN: 38 %
VISC SMN QL: NORMAL
VIT B12 SERPL-MCNC: 1128 PG/ML (ref 193–986)
VIT D+METAB SERPL-MCNC: 22.3 NG/ML (ref 30–100)

## 2022-10-18 PROCEDURE — 86038 ANTINUCLEAR ANTIBODIES: CPT

## 2022-10-18 PROCEDURE — 86225 DNA ANTIBODY NATIVE: CPT

## 2022-10-18 PROCEDURE — 80053 COMPREHEN METABOLIC PANEL: CPT

## 2022-10-18 PROCEDURE — 82607 VITAMIN B-12: CPT

## 2022-10-18 PROCEDURE — 83036 HEMOGLOBIN GLYCOSYLATED A1C: CPT

## 2022-10-18 PROCEDURE — 82306 VITAMIN D 25 HYDROXY: CPT

## 2022-10-18 PROCEDURE — 36415 COLL VENOUS BLD VENIPUNCTURE: CPT

## 2022-10-18 PROCEDURE — 89320 SEMEN ANAL VOL/COUNT/MOT: CPT

## 2022-10-19 ENCOUNTER — OFFICE VISIT (OUTPATIENT)
Dept: SURGERY | Facility: CLINIC | Age: 37
End: 2022-10-19
Payer: COMMERCIAL

## 2022-10-19 VITALS
HEIGHT: 67 IN | SYSTOLIC BLOOD PRESSURE: 110 MMHG | BODY MASS INDEX: 24.96 KG/M2 | WEIGHT: 159 LBS | HEART RATE: 93 BPM | DIASTOLIC BLOOD PRESSURE: 80 MMHG

## 2022-10-19 DIAGNOSIS — M54.2 NECK PAIN: Primary | ICD-10-CM

## 2022-10-20 NOTE — PROGRESS NOTES
Yes, for vitamin D insufficiency prior to onset of winter months I recommend high-dose supplementation: reordered.

## 2022-10-25 ENCOUNTER — OFFICE VISIT (OUTPATIENT)
Dept: RHEUMATOLOGY | Facility: CLINIC | Age: 37
End: 2022-10-25
Payer: COMMERCIAL

## 2022-10-25 ENCOUNTER — LAB ENCOUNTER (OUTPATIENT)
Dept: LAB | Facility: HOSPITAL | Age: 37
End: 2022-10-25
Attending: INTERNAL MEDICINE
Payer: COMMERCIAL

## 2022-10-25 VITALS
WEIGHT: 159 LBS | SYSTOLIC BLOOD PRESSURE: 123 MMHG | HEIGHT: 67 IN | DIASTOLIC BLOOD PRESSURE: 79 MMHG | HEART RATE: 86 BPM | BODY MASS INDEX: 24.96 KG/M2

## 2022-10-25 DIAGNOSIS — M54.2 NECK PAIN: Primary | ICD-10-CM

## 2022-10-25 DIAGNOSIS — G89.29 CHRONIC BILATERAL LOW BACK PAIN WITHOUT SCIATICA: ICD-10-CM

## 2022-10-25 DIAGNOSIS — M54.50 CHRONIC BILATERAL LOW BACK PAIN WITHOUT SCIATICA: ICD-10-CM

## 2022-10-25 LAB — RHEUMATOID FACT SERPL-ACNC: <10 IU/ML (ref ?–15)

## 2022-10-25 PROCEDURE — 86200 CCP ANTIBODY: CPT | Performed by: INTERNAL MEDICINE

## 2022-10-25 PROCEDURE — 99204 OFFICE O/P NEW MOD 45 MIN: CPT | Performed by: INTERNAL MEDICINE

## 2022-10-25 PROCEDURE — 3008F BODY MASS INDEX DOCD: CPT | Performed by: INTERNAL MEDICINE

## 2022-10-25 PROCEDURE — 3074F SYST BP LT 130 MM HG: CPT | Performed by: INTERNAL MEDICINE

## 2022-10-25 PROCEDURE — 3078F DIAST BP <80 MM HG: CPT | Performed by: INTERNAL MEDICINE

## 2022-10-25 PROCEDURE — 36415 COLL VENOUS BLD VENIPUNCTURE: CPT | Performed by: INTERNAL MEDICINE

## 2022-10-25 PROCEDURE — 86431 RHEUMATOID FACTOR QUANT: CPT | Performed by: INTERNAL MEDICINE

## 2022-10-25 NOTE — PATIENT INSTRUCTIONS
You were seen today for neck and lower back pain  Work-up in the past was negative for ankylosing spondylitis which is an autoimmune disease causing back pain  For other joint pain we will get work-up for rheumatoid arthritis again.     In the meantime try seeing the physiatrist for your back pain

## 2022-10-29 LAB — CCP IGG SERPL-ACNC: 0.7 U/ML (ref 0–6.9)

## 2022-11-29 ENCOUNTER — HOSPITAL ENCOUNTER (OUTPATIENT)
Dept: GENERAL RADIOLOGY | Age: 37
Discharge: HOME | End: 2022-11-29
Attending: PHYSICAL MEDICINE & REHABILITATION
Payer: COMMERCIAL

## 2022-11-29 ENCOUNTER — OFFICE VISIT (OUTPATIENT)
Dept: PHYSICAL MEDICINE AND REHAB | Facility: CLINIC | Age: 37
End: 2022-11-29
Payer: COMMERCIAL

## 2022-11-29 VITALS — BODY MASS INDEX: 24.96 KG/M2 | WEIGHT: 159 LBS | HEIGHT: 67 IN

## 2022-11-29 DIAGNOSIS — M99.9 BIOMECHANICAL LESION: ICD-10-CM

## 2022-11-29 DIAGNOSIS — M79.10 MYALGIA: ICD-10-CM

## 2022-11-29 DIAGNOSIS — M47.816 LUMBAR SPONDYLOSIS: ICD-10-CM

## 2022-11-29 DIAGNOSIS — M79.10 MYALGIA: Primary | ICD-10-CM

## 2022-11-29 DIAGNOSIS — M47.816 FACET SYNDROME, LUMBAR: ICD-10-CM

## 2022-11-29 DIAGNOSIS — M54.59 MECHANICAL LOW BACK PAIN: ICD-10-CM

## 2022-11-29 PROCEDURE — 99204 OFFICE O/P NEW MOD 45 MIN: CPT | Performed by: PHYSICAL MEDICINE & REHABILITATION

## 2022-11-29 PROCEDURE — 3008F BODY MASS INDEX DOCD: CPT | Performed by: PHYSICAL MEDICINE & REHABILITATION

## 2022-11-29 PROCEDURE — 72110 X-RAY EXAM L-2 SPINE 4/>VWS: CPT | Performed by: PHYSICAL MEDICINE & REHABILITATION

## 2022-11-29 RX ORDER — DULOXETINE 40 MG/1
80 CAPSULE, DELAYED RELEASE ORAL DAILY
COMMUNITY
Start: 2022-11-29

## 2022-11-29 RX ORDER — DICLOFENAC SODIUM 75 MG/1
75 TABLET, DELAYED RELEASE ORAL 2 TIMES DAILY
Qty: 60 TABLET | Refills: 1 | Status: SHIPPED | OUTPATIENT
Start: 2022-11-29

## 2022-11-29 NOTE — PATIENT INSTRUCTIONS
1) Please get X-rays of the Lumbar today on your way out. 2) Continue Duloxetine 80 mg nightly as per psychiatry  3) Please begin physical therapy as soon as possible. 4) Take Diclofenac 75 mg 1 tablet twice per day with food for the next two weeks and then as needed but no more than 2 tablets per day. Do not take with any other NSAIDS (Ibuprofen, Advil, Aleve, Naprosyn etc). OK to take Tylenol 500 mg every 6 hours as needed for pain. If you develop any side effects including stomach aches, nausea, vomiting, or other gastrointestinal symptoms, stop the medication and call my office. 5) Tylenol 500-1000 mg every 6-8 hours as needed for pain. No more than 3000 mg daily. 6) Follow up with me in about 8 weeks.  If symptoms persist, then would recommend MRI lumbar spine

## 2022-12-18 PROBLEM — F41.9 ANXIETY DISORDER: Status: ACTIVE | Noted: 2022-12-18

## 2022-12-18 PROBLEM — F32.9 REACTIVE DEPRESSION: Status: ACTIVE | Noted: 2022-12-18

## 2023-02-15 ENCOUNTER — HOSPITAL ENCOUNTER (OUTPATIENT)
Age: 38
Discharge: HOME | End: 2023-02-15
Payer: COMMERCIAL

## 2023-02-15 VITALS
OXYGEN SATURATION: 96 % | RESPIRATION RATE: 18 BRPM | SYSTOLIC BLOOD PRESSURE: 108 MMHG | DIASTOLIC BLOOD PRESSURE: 62 MMHG | HEART RATE: 102 BPM | TEMPERATURE: 98 F

## 2023-02-15 DIAGNOSIS — A08.4 VIRAL GASTROENTERITIS: Primary | ICD-10-CM

## 2023-02-15 LAB
#MXD IC: 0.4 X10ˆ3/UL (ref 0.1–1)
BUN BLD-MCNC: 21 MG/DL (ref 7–18)
CHLORIDE BLD-SCNC: 106 MMOL/L (ref 98–112)
CO2 BLD-SCNC: 23 MMOL/L (ref 21–32)
CREAT BLD-MCNC: 1.7 MG/DL
GFR SERPLBLD BASED ON 1.73 SQ M-ARVRAT: 53 ML/MIN/1.73M2 (ref 60–?)
GLUCOSE BLD-MCNC: 118 MG/DL (ref 70–99)
HCT VFR BLD AUTO: 45 %
HCT VFR BLD CALC: 46 %
HGB BLD-MCNC: 14.8 G/DL
ISTAT IONIZED CALCIUM FOR CHEM 8: 1.2 MMOL/L (ref 1.12–1.32)
LYMPHOCYTES # BLD AUTO: 0.2 X10ˆ3/UL (ref 1–4)
LYMPHOCYTES NFR BLD AUTO: 1.5 %
MCH RBC QN AUTO: 29.5 PG (ref 26–34)
MCHC RBC AUTO-ENTMCNC: 32.9 G/DL (ref 31–37)
MCV RBC AUTO: 89.8 FL (ref 80–100)
MIXED CELL %: 3.9 %
NEUTROPHILS # BLD AUTO: 10.4 X10ˆ3/UL (ref 1.5–7.7)
NEUTROPHILS NFR BLD AUTO: 94.6 %
PLATELET # BLD AUTO: 162 X10ˆ3/UL (ref 150–450)
POCT BLOOD URINE: NEGATIVE
POCT GLUCOSE URINE: NEGATIVE MG/DL
POCT INFLUENZA A: NEGATIVE
POCT INFLUENZA B: NEGATIVE
POCT LEUKOCYTE ESTERASE URINE: NEGATIVE
POCT NITRITE URINE: NEGATIVE
POCT PH URINE: 5.5 (ref 5–8)
POCT PROTEIN URINE: NEGATIVE MG/DL
POCT SPECIFIC GRAVITY URINE: 1.02
POCT URINE CLARITY: CLEAR
POCT UROBILINOGEN URINE: 0.2 MG/DL
POTASSIUM BLD-SCNC: 4.4 MMOL/L (ref 3.6–5.1)
RBC # BLD AUTO: 5.01 X10ˆ6/UL
SARS-COV-2 RNA RESP QL NAA+PROBE: NOT DETECTED
SODIUM BLD-SCNC: 142 MMOL/L (ref 136–145)
WBC # BLD AUTO: 11 X10ˆ3/UL (ref 4–11)

## 2023-02-15 PROCEDURE — 81002 URINALYSIS NONAUTO W/O SCOPE: CPT | Performed by: NURSE PRACTITIONER

## 2023-02-15 PROCEDURE — 85025 COMPLETE CBC W/AUTO DIFF WBC: CPT | Performed by: NURSE PRACTITIONER

## 2023-02-15 PROCEDURE — U0002 COVID-19 LAB TEST NON-CDC: HCPCS | Performed by: NURSE PRACTITIONER

## 2023-02-15 PROCEDURE — 99213 OFFICE O/P EST LOW 20 MIN: CPT | Performed by: NURSE PRACTITIONER

## 2023-02-15 PROCEDURE — 96374 THER/PROPH/DIAG INJ IV PUSH: CPT | Performed by: NURSE PRACTITIONER

## 2023-02-15 PROCEDURE — 87502 INFLUENZA DNA AMP PROBE: CPT | Performed by: NURSE PRACTITIONER

## 2023-02-15 PROCEDURE — 96375 TX/PRO/DX INJ NEW DRUG ADDON: CPT | Performed by: NURSE PRACTITIONER

## 2023-02-15 PROCEDURE — 80047 BASIC METABLC PNL IONIZED CA: CPT | Performed by: NURSE PRACTITIONER

## 2023-02-15 RX ORDER — KETOROLAC TROMETHAMINE 30 MG/ML
30 INJECTION, SOLUTION INTRAMUSCULAR; INTRAVENOUS ONCE
Status: COMPLETED | OUTPATIENT
Start: 2023-02-15 | End: 2023-02-15

## 2023-02-15 RX ORDER — ONDANSETRON 4 MG/1
4 TABLET, ORALLY DISINTEGRATING ORAL EVERY 8 HOURS PRN
Qty: 10 TABLET | Refills: 0 | Status: SHIPPED | OUTPATIENT
Start: 2023-02-15 | End: 2023-02-22

## 2023-02-15 RX ORDER — SODIUM CHLORIDE 9 MG/ML
1000 INJECTION, SOLUTION INTRAVENOUS ONCE
Status: COMPLETED | OUTPATIENT
Start: 2023-02-15 | End: 2023-02-15

## 2023-02-15 RX ORDER — ONDANSETRON 2 MG/ML
4 INJECTION INTRAMUSCULAR; INTRAVENOUS ONCE
Status: COMPLETED | OUTPATIENT
Start: 2023-02-15 | End: 2023-02-15

## 2023-02-15 NOTE — DISCHARGE INSTRUCTIONS
Please use Zofran for any further nausea. Stay hydrated as discussed. Advance her diet as tolerated. You will need repeat lab work to check your kidney function in 1 week. ER if worse.

## 2023-02-15 NOTE — ED INITIAL ASSESSMENT (HPI)
Pt c/o sudden onset of abdominal pain and 6 diarrhea stools. Pt states symptoms started 2 hours ago.

## 2023-03-04 RX ORDER — DICLOFENAC SODIUM 75 MG/1
75 TABLET, DELAYED RELEASE ORAL 2 TIMES DAILY
Qty: 60 TABLET | Refills: 1 | OUTPATIENT
Start: 2023-03-04

## 2023-03-04 NOTE — TELEPHONE ENCOUNTER
Decline refill called pharmacy there is a refill ready since 2/28/23.      Left voice mail to call office to schedule a follow up appointment,

## 2023-03-16 ENCOUNTER — OFFICE VISIT (OUTPATIENT)
Dept: PHYSICAL MEDICINE AND REHAB | Facility: CLINIC | Age: 38
End: 2023-03-16
Payer: COMMERCIAL

## 2023-03-16 VITALS
HEIGHT: 67 IN | DIASTOLIC BLOOD PRESSURE: 76 MMHG | SYSTOLIC BLOOD PRESSURE: 112 MMHG | BODY MASS INDEX: 23.54 KG/M2 | WEIGHT: 150 LBS

## 2023-03-16 DIAGNOSIS — M54.2 TRIGGER POINT OF NECK: ICD-10-CM

## 2023-03-16 DIAGNOSIS — M50.30 BULGE OF CERVICAL DISC WITHOUT MYELOPATHY: ICD-10-CM

## 2023-03-16 DIAGNOSIS — M50.30 DDD (DEGENERATIVE DISC DISEASE), CERVICAL: ICD-10-CM

## 2023-03-16 DIAGNOSIS — M47.812 CERVICAL FACET SYNDROME: ICD-10-CM

## 2023-03-16 DIAGNOSIS — G56.03 CARPAL TUNNEL SYNDROME ON BOTH SIDES: ICD-10-CM

## 2023-03-16 DIAGNOSIS — M79.10 MYALGIA: ICD-10-CM

## 2023-03-16 DIAGNOSIS — R20.0 NUMBNESS IN BOTH HANDS: Primary | ICD-10-CM

## 2023-03-16 PROCEDURE — 3074F SYST BP LT 130 MM HG: CPT | Performed by: PHYSICAL MEDICINE & REHABILITATION

## 2023-03-16 PROCEDURE — 3008F BODY MASS INDEX DOCD: CPT | Performed by: PHYSICAL MEDICINE & REHABILITATION

## 2023-03-16 PROCEDURE — 3078F DIAST BP <80 MM HG: CPT | Performed by: PHYSICAL MEDICINE & REHABILITATION

## 2023-03-16 PROCEDURE — 99214 OFFICE O/P EST MOD 30 MIN: CPT | Performed by: PHYSICAL MEDICINE & REHABILITATION

## 2023-03-16 NOTE — PATIENT INSTRUCTIONS
1) Tylenol 500-1000 mg every 6-8 hours as needed for pain. No more than 3000 mg daily. 2) Start resting wrist splints nightly for carpal tunnel  3) My office will call you to schedule the BILATERAL carpal tunnel CSI under ultrasound guidance once the procedure is approved by your insurance carrier. 4) Please begin physical therapy as soon as possible. This will focus more on the neck.   5) Follow up with me about 6 weeks after you begin therapy and sooner for the carpal tunnel injections   6) Continue Duloxetine wean as per psychiatry
Yes

## 2023-03-20 ENCOUNTER — TELEPHONE (OUTPATIENT)
Dept: PHYSICAL MEDICINE AND REHAB | Facility: CLINIC | Age: 38
End: 2023-03-20

## 2023-03-20 NOTE — TELEPHONE ENCOUNTER
Initiated authorization for BILATERAL carpal tunnel CSI under ultrasound guidance CPT 20526x2, [id number], [id number] with Jimmy Gilman at [facility]  Case #[id number]  Status: Approved-authorization is not required per health plan    Patient scheduled tomorrow 3/21/23 at

## 2023-03-21 ENCOUNTER — OFFICE VISIT (OUTPATIENT)
Dept: PHYSICAL MEDICINE AND REHAB | Facility: CLINIC | Age: 38
End: 2023-03-21
Payer: COMMERCIAL

## 2023-03-21 DIAGNOSIS — R20.0 NUMBNESS IN BOTH HANDS: Primary | ICD-10-CM

## 2023-03-21 DIAGNOSIS — G56.03 CARPAL TUNNEL SYNDROME ON BOTH SIDES: ICD-10-CM

## 2023-03-21 NOTE — PATIENT INSTRUCTIONS
Post Injection Instructions     Please do not do anything strenuous over the next two days (if you had a knee injection do not walk more than 2 city blocks, do not attend any aerobic classes, do not run, no heavy lifting, no prolong standing). You may resume your day to day activities after your injection. You may experience some mild amount of swelling after the procedure. Please ice your joint that was injected at least 5-6 times a day (15 minutes) for two days after (this will help prevent worsening pain that sometimes occurs after an injection). Only take tylenol if needed for pain for the first few days. Watch for signs of infection which include redness, warmth, worsening pain, fevers or chills. If you develop any of these signs call the office immediately at [phone]    Everyone responds differently to injections, but you can expect your peak effects a few weeks after your last injection. Trinchera Petrona.  Yumi Mccurdy MD  Physical Medicine and Rehabilitation/Sports Medicine  MEDICAL [facility]

## 2023-03-22 RX ORDER — TRIAMCINOLONE ACETONIDE 40 MG/ML
80 INJECTION, SUSPENSION INTRA-ARTICULAR; INTRAMUSCULAR ONCE
Status: COMPLETED | OUTPATIENT
Start: 2023-03-22 | End: 2023-03-22

## 2023-03-22 RX ORDER — LIDOCAINE HYDROCHLORIDE 10 MG/ML
6 INJECTION, SOLUTION INFILTRATION; PERINEURAL ONCE
Status: COMPLETED | OUTPATIENT
Start: 2023-03-22 | End: 2023-03-22

## 2023-03-22 RX ADMIN — LIDOCAINE HYDROCHLORIDE 6 ML: 10 INJECTION, SOLUTION INFILTRATION; PERINEURAL at 15:32:00

## 2023-03-22 RX ADMIN — TRIAMCINOLONE ACETONIDE 80 MG: 40 INJECTION, SUSPENSION INTRA-ARTICULAR; INTRAMUSCULAR at 15:33:00

## 2023-04-01 NOTE — TELEPHONE ENCOUNTER
Medication request: Diclofenac 75 mg oral tab. Take 1 tablet by mouth 2 times daily. Take with food for 2 weeks as directed and then as needed. #60. 1 refill.     LOV: 3/16/2023  NOV:none    [place] 2/28/2023 (per [facility] pharmacist)

## 2023-04-03 RX ORDER — ERGOCALCIFEROL 1.25 MG/1
50000 CAPSULE ORAL WEEKLY
Qty: 12 CAPSULE | Refills: 0 | Status: SHIPPED | OUTPATIENT
Start: 2023-04-03 | End: 2023-06-20

## 2023-04-03 RX ORDER — DICLOFENAC SODIUM 75 MG/1
75 TABLET, DELAYED RELEASE ORAL 2 TIMES DAILY
Qty: 60 TABLET | Refills: 1 | Status: SHIPPED | OUTPATIENT
Start: 2023-04-03

## 2023-04-03 NOTE — TELEPHONE ENCOUNTER
Last visit- 07/05/2022 asthma    Last refill- 01/06/2021 ergocalciferol 1.25mg QTY12 0R    Last labs- 10/18/2022 vitamin d  Future Appointments   Date Time Provider Av Gonzalez   4/24/2023  6:20 PM SHIVANI Scott       Per Protocol?   Please approve or deny

## 2023-04-12 ENCOUNTER — HOSPITAL ENCOUNTER (OUTPATIENT)
Age: 38
Discharge: HOME | End: 2023-04-12
Payer: COMMERCIAL

## 2023-04-12 VITALS
RESPIRATION RATE: 18 BRPM | DIASTOLIC BLOOD PRESSURE: 80 MMHG | OXYGEN SATURATION: 97 % | TEMPERATURE: 99 F | SYSTOLIC BLOOD PRESSURE: 113 MMHG | HEART RATE: 105 BPM

## 2023-04-12 DIAGNOSIS — S61.012A LACERATION OF LEFT THUMB WITHOUT FOREIGN BODY WITHOUT DAMAGE TO NAIL, INITIAL ENCOUNTER: Primary | ICD-10-CM

## 2023-04-12 PROCEDURE — 12001 RPR S/N/AX/GEN/TRNK 2.5CM/<: CPT

## 2023-04-12 PROCEDURE — 99213 OFFICE O/P EST LOW 20 MIN: CPT

## 2023-04-12 NOTE — ED INITIAL ASSESSMENT (HPI)
Presents with laceration to left thumb from razor blade about 1 hour PTA. Bleeding controlled with pressure. + distal CMS. States his last TDaP was 3 years ago.

## 2023-06-01 RX ORDER — DICLOFENAC SODIUM 75 MG/1
75 TABLET, DELAYED RELEASE ORAL 2 TIMES DAILY
Qty: 60 TABLET | Refills: 1 | Status: SHIPPED | OUTPATIENT
Start: 2023-06-01

## 2023-06-01 NOTE — TELEPHONE ENCOUNTER
Refill Request    Medication request:DICLOFENAC 75 MG Oral Tab EC  TAKE 1 TABLET (75 MG TOTAL) BY MOUTH 2 (TWO) TIMES DAILY. TAKE WITH FOOD FOR 2 WEEKS AS DIRECTED AND THEN AS NEEDED.    QFR:6/79/5564 Leah Acevedo MD   Due back to clinic per last office note:  He will follow-up with me for the procedure as well as 6 weeks after beginning therapy. NOV: Visit date not found      [place]: 5/5/23 # 60    Urine drug screen (if applicable): na  Pain contract: na    LOV plan (if weaning or changing medications):     *[employer] message sent to pt to schedule a office follow up.

## 2023-07-11 NOTE — TELEPHONE ENCOUNTER
Refill Request    Medication request: Diclofenac 75mg oral tab EC    LOV: 3/21/2023 Faith Knapp MD   RTC: Post PT/INJ  NOV: Visit date not found      [place]: 6/1/23 #29    UDS: (if applicable): None  Pain contract: None    LOV plan (if weaning or changing medications): N/A mentioned. Patient notified to schedule follow up appointment on 6/1/23, failed to schedule    Patient notified via [employer] to schedule an appointment, will forward once patient has schedule an appointment with Dr. Yumi Mccurdy.

## 2023-07-12 PROBLEM — R45.4 IRRITABILITY: Status: ACTIVE | Noted: 2023-07-12

## 2023-07-18 RX ORDER — DICLOFENAC SODIUM 75 MG/1
75 TABLET, DELAYED RELEASE ORAL 2 TIMES DAILY
Qty: 60 TABLET | Refills: 1 | OUTPATIENT
Start: 2023-07-18

## 2023-07-24 RX ORDER — ERGOCALCIFEROL 1.25 MG/1
50000 CAPSULE ORAL WEEKLY
Qty: 12 CAPSULE | Refills: 0 | Status: SHIPPED | OUTPATIENT
Start: 2023-07-24 | End: 2023-10-10

## 2023-07-24 NOTE — TELEPHONE ENCOUNTER
Last VISIT 2022    Last CPE 2020    Last REFILL  Medication Quantity Refills Start End   ERGOCALCIFEROL 1.25 MG (55458 UT) Oral Cap () 12 capsule 0 4/3/2023 2023   Sig:   TAKE 1 CAPSULE (50,000 UNITS TOTAL) BY MOUTH ONCE A WEEK FOR 12 DOSES       Last LABS  Vitamin D- 10/18/2022    No future appointments. Per PROTOCOL? Refill pended    Please Approve or Deny.

## 2023-10-12 RX ORDER — ERGOCALCIFEROL 1.25 MG/1
50000 CAPSULE ORAL WEEKLY
Qty: 12 CAPSULE | Refills: 0 | OUTPATIENT
Start: 2023-10-12 | End: 2023-12-29

## 2023-10-12 NOTE — TELEPHONE ENCOUNTER
Requested Prescriptions     Pending Prescriptions Disp Refills    ERGOCALCIFEROL 1.25 MG (39067 UT) Oral Cap [Pharmacy Med Name: VITAMIN D2 1.25MG(50,000 UNIT)] 12 capsule 0     Sig: TAKE 1 CAPSULE (50,000 UNITS TOTAL) BY MOUTH ONCE A WEEK FOR 12 DOSES       LOV: 7/5/22    LAST PHYSICAL: 11/20/20    LAST REFILL: ergocalciferol-12-7/24/23    LAST LAB: 10/18/22    Your Appointments      Thursday November 02, 2023  3:40 PM  Video Visit with SHIVANI Macias [address] ([address] [place] [address]  [phone]   Please verify your telehealth insurance benefits prior to your appointment. You must be in the state of [place] during the virtual visit. Please use the [email or web address]et and launch the video visit 10 minutes prior to your scheduled appointment time to ensure your camera and microphone are working properly. Once the video visit has started you will be placed in a waiting room until the provider begins the visit. You will receive an email confirmation with instructions. If you have questions, call your doctor's office directly. If you are having issues or need to use a desktop/laptop, please follow the below steps:        1. Close out all other open apps (could be competing for audio resources)  2. Disable Bluetooth  3.       Reboot mobile device before joining the video  4. Come off Wi-Fi and switch over to Data    Please see our Video Visit Tip Sheet if you need additional assistance. If you believe this is an emergency, please dial 911 immediately.

## 2023-11-21 RX ORDER — ERGOCALCIFEROL 1.25 MG/1
50000 CAPSULE ORAL WEEKLY
Qty: 12 CAPSULE | Refills: 0 | OUTPATIENT
Start: 2023-11-21 | End: 2024-02-07

## 2024-05-30 PROBLEM — F39 MOOD DISORDER IN FULL REMISSION (HCC): Status: ACTIVE | Noted: 2024-05-30

## 2024-05-30 PROBLEM — F39 MOOD DISORDER IN FULL REMISSION: Status: ACTIVE | Noted: 2024-05-30

## 2024-06-06 ENCOUNTER — PATIENT MESSAGE (OUTPATIENT)
Dept: INTERNAL MEDICINE CLINIC | Facility: CLINIC | Age: 39
End: 2024-06-06

## 2024-06-07 NOTE — TELEPHONE ENCOUNTER
From: Jose Ramon Huerta  To: Feliberto Lua  Sent: 6/6/2024 10:35 AM CDT  Subject: Question    Who would you recommend in your office building for a pediatrician? We are currently 27 weeks pregnant!(28 on monday) unless you are ok with beingnour primary for the baby. Please let me know at your convenience.     Thank you for your time,  Jose Ramon huerta

## 2024-06-09 NOTE — TELEPHONE ENCOUNTER
That's great news!    Dr. Sharath Martinez ([facility])  Dimitrios Marcano and Deirdre Omer ([facility])

## 2024-07-03 ENCOUNTER — PATIENT MESSAGE (OUTPATIENT)
Dept: PHYSICAL MEDICINE AND REHAB | Facility: CLINIC | Age: 39
End: 2024-07-03

## 2024-07-03 NOTE — TELEPHONE ENCOUNTER
From: Jose Ramon Huerta  To: Alex Behar  Sent: 7/3/2024 8:28 AM CDT  Subject: Cortisone shot    I think I’m going to need another dose of cortisone in my hands for the carpal tunnel. They are starting to act up again. Also I’m getting this pain in my elbow. I think it’s called ‘tennis elbow’ is there anything I can do for that? If I need to schedule an appointment I most certainly can.     Thank you for your time,  Jose Ramon Huerta

## 2024-08-12 RX ORDER — DICLOFENAC SODIUM 75 MG/1
75 TABLET, DELAYED RELEASE ORAL 2 TIMES DAILY
Qty: 60 TABLET | Refills: 1 | OUTPATIENT
Start: 2024-08-12

## 2024-08-12 NOTE — TELEPHONE ENCOUNTER
Refill Request    Medication request: DICLOFENAC 75 MG Oral Tab EC. TAKE 1 TABLET (75 MG TOTAL) BY MOUTH 2 (TWO) TIMES DAILY. TAKE WITH FOOD FOR 2 WEEKS AS DIRECTED AND THEN AS NEEDED.      LOV:03/16/2023 (injection on 3/21/2023)  Due back to clinic per last office note:  injection follow up  NOV: 8/21/2024 Behar, Alex, MD      [place]: unable to obtain    Urine drug screen (if applicable): N/A  Pain contract: N/A    LOV plan (if weaning or changing medications): not mentioned           Refill denied. Appointment needed prior

## 2024-12-02 ENCOUNTER — HOSPITAL ENCOUNTER (EMERGENCY)
Age: 39
Discharge: HOME | End: 2024-12-02
Payer: COMMERCIAL

## 2024-12-02 VITALS
WEIGHT: 142 LBS | HEART RATE: 79 BPM | TEMPERATURE: 98 F | DIASTOLIC BLOOD PRESSURE: 77 MMHG | SYSTOLIC BLOOD PRESSURE: 112 MMHG | BODY MASS INDEX: 22.29 KG/M2 | OXYGEN SATURATION: 99 % | RESPIRATION RATE: 16 BRPM | HEIGHT: 67 IN

## 2024-12-02 DIAGNOSIS — S39.012A LOW BACK STRAIN, INITIAL ENCOUNTER: Primary | ICD-10-CM

## 2024-12-02 LAB
BILIRUB UR QL STRIP.AUTO: NEGATIVE
CLARITY UR REFRACT.AUTO: CLEAR
COLOR UR AUTO: YELLOW
GLUCOSE UR STRIP.AUTO-MCNC: NEGATIVE MG/DL
KETONES UR STRIP.AUTO-MCNC: NEGATIVE MG/DL
LEUKOCYTE ESTERASE UR QL STRIP.AUTO: NEGATIVE
NITRITE UR QL STRIP.AUTO: NEGATIVE
PH UR STRIP.AUTO: 5.5 [PH] (ref 5–8)
PROT UR STRIP.AUTO-MCNC: NEGATIVE MG/DL
RBC UR QL AUTO: NEGATIVE
SP GR UR STRIP.AUTO: 1.01 (ref 1–1.03)
UROBILINOGEN UR STRIP.AUTO-MCNC: 0.2 MG/DL

## 2024-12-02 PROCEDURE — 99284 EMERGENCY DEPT VISIT MOD MDM: CPT

## 2024-12-02 PROCEDURE — 99283 EMERGENCY DEPT VISIT LOW MDM: CPT

## 2024-12-02 PROCEDURE — 81003 URINALYSIS AUTO W/O SCOPE: CPT

## 2024-12-02 RX ORDER — CYCLOBENZAPRINE HCL 10 MG
10 TABLET ORAL NIGHTLY PRN
Qty: 10 TABLET | Refills: 0 | Status: SHIPPED | OUTPATIENT
Start: 2024-12-02 | End: 2024-12-09

## 2024-12-02 RX ORDER — IBUPROFEN 600 MG/1
600 TABLET, FILM COATED ORAL EVERY 8 HOURS PRN
Qty: 30 TABLET | Refills: 0 | Status: SHIPPED | OUTPATIENT
Start: 2024-12-02 | End: 2024-12-09

## 2024-12-02 NOTE — ED PROVIDER NOTES
Patient Seen in: [facility] In [place]      History     Chief Complaint   Patient presents with    Back Pain     Stated Complaint: lower back pain    Subjective:   HPI      CHIEF COMPLAINT: bilateral low back pain     HISTORY OF PRESENT ILLNESS: Patient is a 39-year-old male presenting for evaluation of 3 to 4 days of back pain.  Denies any traumatic injury like a fall or car accident.  States that he has been taking Tylenol without relief.  Few specific movements such as flexing forward at the hips causes pain to get worse.  No pain that radiates down the buttocks or into the legs.  Denies any saddle anesthesia.  No loss of bowel or bladder control.  Nuys any weakness of the lower extremities.  No numbness or tingling of the lower extremities.    Red Flags of Back Pain Reviewed:   History of cancer: no   Pain not relieved by rest: no   HIV: denies   Unexplained weight loss: no   History of chronic infections or fever: no   Presence of worsening night time pain: no   Perianal anesthesia: denies   Bilateral sciatica/ Bilateral leg weakness: denies   Overflow incontinence: denies       REVIEW OF SYSTEMS:  Constitutional: no fever, no chills  Eyes: no discharge  ENT: no sore throat  Cardiovascular: no chest pain, no palpitations  Respiratory: no cough, no shortness of breath  Gastrointestinal: no abdominal pain, no vomiting  Genitourinary: no hematuria  Musculoskeletal: As above  Skin: no rashes  Neurological: no headache     Otherwise a complete review of systems was obtained and other than the HPI was negative     The patient's medication list, past medical history and social history elements is as listed in today's nurse's notes are reviewed and agree. The patient's family history is reviewed and is noncontributory to the presenting problem, except as indicated as above.    Objective:     Past Medical History:    ADHD    Anxiety    Was on and off but now it is more frequent    Asthma (HCC)     Back pain    Gout    Migraines    Polyarthralgia    Reactive depression              Past Surgical History:   Procedure Laterality Date    Other surgical history  02/01/2020    right hand surgery                Social History     Socioeconomic History    Marital status:    Tobacco Use    Smoking status: Never    Smokeless tobacco: Never   Vaping Use    Vaping status: Never Used   Substance and Sexual Activity    Alcohol use: Not Currently     Comment: twice year socially    Drug use: Never   Other Topics Concern    Caffeine Concern Yes     Comment: Coffee: 1-2 cups    Exercise No   Social History Narrative    The patient does not use an assistive device..      The patient does live in a home with stairs.     Social Drivers of Health      [facility]    Housing Stability                  Physical Exam     ED Triage Vitals [12/02/24 1157]   /76   Pulse 88   Resp 16   Temp 98.4 °F (36.9 °C)   Temp src Temporal   SpO2 98 %   O2 Device None (Room air)       Current Vitals:   Vital Signs  BP: 112/77  Pulse: 79  Resp: 16  Temp: 98.4 °F (36.9 °C)  Temp src: Temporal    Oxygen Therapy  SpO2: 99 %  O2 Device: None (Room air)        Physical Exam    Vital signs and nursing notes reviewed  General Appearance: No acute distress  Neurological:  A&Ox3,  Gait normal.  Psychiatric: calm and cooperative  Respiratory: CTAB  Cardiovascular: RRR, S1/S2, no m/c/r  Musculoskeletal: Extremities are symmetrical, full range of motion  Skin:  warm and dry, no rashes.   Back: no CVA tenderness bilaterally. No tenderness to palpation of C-spine, T-spine or L-spine.  Mild tenderness to palpation of the bilateral lumbar paraspinal musculature.  No overlying warmth, erythema, edema, vesicles or ecchymosis.  Extremities: +5 out of 5 bilateral lower extremity strength.  2+ patellar DTRs.  Normal sensation.  No edema bilaterally.   Abdomen: Soft, nontender, nondistended.   No pulsatile masses    ED Course     Labs Reviewed   URINALYSIS WITH CULTURE REFLEX            Differential diagnosis is muscle strain, muscle spasm, disc herniation       MDM      This is a well-appearing 39-year-old male with stable vital signs presenting for evaluation of low back pain.  Pain is reproducible on exam today.  Urinalysis negative.  Will treat with ibuprofen and Flexeril.  Ice the affected area.  Gentle stretching as tolerated.  Follow with primary care.  If there are any new, changing or worsening symptoms go to the ER.  Patient voiced understanding to the treatment plan.  All questions answered        Medical Decision Making  Amount and/or Complexity of Data Reviewed  Labs: ordered. Decision-making details documented in ED Course.    Risk  Prescription drug management.        Disposition and Plan     Clinical Impression:  1. Low back strain, initial encounter         Disposition:  Discharge  12/2/2024  2:21 pm    Follow-up:  Feliberto Lua MD  [address] [place] [id number]  [phone]    Follow up in 1 week(s)  If symptoms worsen          Medications Prescribed:  Discharge Medication List as of 12/2/2024  2:22 PM        START taking these medications    Details   ibuprofen 600 MG Oral Tab Take 1 tablet (600 mg total) by mouth every 8 (eight) hours as needed for Pain or Fever., Normal, Disp-30 tablet, R-0      cyclobenzaprine 10 MG Oral Tab Take 1 tablet (10 mg total) by mouth nightly as needed for Muscle spasms., Normal, Disp-10 tablet, R-0                 Supplementary Documentation:

## 2024-12-02 NOTE — DISCHARGE INSTRUCTIONS
Take ibuprofen with food as needed.  Ibuprofen can interact with your sertraline.  It can increase your risk for a GI bleed.  If you develop abdominal pain or bleeding while on this medication discontinue the ibuprofen.  Take the Flexeril as directed.  Do not drink alcohol or drive while you are on this medication.  Ice the affected area.  Gentle stretching as tolerated.    If there are any new, changing or worsening symptoms go to the ER.

## 2024-12-05 ENCOUNTER — OFFICE VISIT (OUTPATIENT)
Dept: INTERNAL MEDICINE CLINIC | Facility: CLINIC | Age: 39
End: 2024-12-05
Payer: COMMERCIAL

## 2024-12-05 ENCOUNTER — HOSPITAL ENCOUNTER (OUTPATIENT)
Dept: GENERAL RADIOLOGY | Age: 39
Discharge: HOME | End: 2024-12-05
Payer: COMMERCIAL

## 2024-12-05 VITALS
HEART RATE: 103 BPM | TEMPERATURE: 99 F | BODY MASS INDEX: 22.89 KG/M2 | SYSTOLIC BLOOD PRESSURE: 110 MMHG | OXYGEN SATURATION: 99 % | RESPIRATION RATE: 16 BRPM | HEIGHT: 67 IN | DIASTOLIC BLOOD PRESSURE: 80 MMHG | WEIGHT: 145.81 LBS

## 2024-12-05 DIAGNOSIS — M54.41 CHRONIC BILATERAL LOW BACK PAIN WITH BILATERAL SCIATICA: ICD-10-CM

## 2024-12-05 DIAGNOSIS — G89.29 CHRONIC BILATERAL LOW BACK PAIN WITH BILATERAL SCIATICA: ICD-10-CM

## 2024-12-05 DIAGNOSIS — M54.42 CHRONIC BILATERAL LOW BACK PAIN WITH BILATERAL SCIATICA: Primary | ICD-10-CM

## 2024-12-05 DIAGNOSIS — G89.29 CHRONIC BILATERAL LOW BACK PAIN WITH BILATERAL SCIATICA: Primary | ICD-10-CM

## 2024-12-05 DIAGNOSIS — M54.41 CHRONIC BILATERAL LOW BACK PAIN WITH BILATERAL SCIATICA: Primary | ICD-10-CM

## 2024-12-05 DIAGNOSIS — M54.42 CHRONIC BILATERAL LOW BACK PAIN WITH BILATERAL SCIATICA: ICD-10-CM

## 2024-12-05 PROBLEM — Z86.59 HISTORY OF ANXIETY DISORDER: Status: ACTIVE | Noted: 2024-12-05

## 2024-12-05 PROCEDURE — 3079F DIAST BP 80-89 MM HG: CPT

## 2024-12-05 PROCEDURE — 3008F BODY MASS INDEX DOCD: CPT

## 2024-12-05 PROCEDURE — 3074F SYST BP LT 130 MM HG: CPT

## 2024-12-05 PROCEDURE — 72110 X-RAY EXAM L-2 SPINE 4/>VWS: CPT

## 2024-12-05 PROCEDURE — 99214 OFFICE O/P EST MOD 30 MIN: CPT

## 2024-12-05 NOTE — PROGRESS NOTES
Jose Ramon Huerta is a 39 year old male.   Chief Complaint   Patient presents with    Follow - Up     Yb rm 16 b er follow up  excruciating lower back pain     HPI:    Patient here today for ER follow up s/p fall while in shower. Reports when he fell he landed back  and did not hit head. No wounds sustained. Chronic lower back pain with at times radiates down both legs but presents as one leg impacted at a time. He reports 10+ year history of lower back pain which he has seen a specialist years ago. He has a 4 month old at home and he reports pain impacting holding her. He denies any urinary or bowel movement changes. No numbness or tingling. Pain is reproducible. Reviewed last imaging completed of lower back with patient: degenerative changes of lumbar spine.     Allergies:  Allergies[1]   Current Meds:  Current Outpatient Medications   Medication Sig Dispense Refill    [START ON 12/13/2024] amphetamine-dextroamphetamine ER (ADDERALL XR) 30 MG Oral Capsule SR 24 Hr Take 1 capsule (30 mg total) by mouth daily. 30 capsule 0    [START ON 1/11/2025] amphetamine-dextroamphetamine ER (ADDERALL XR) 30 MG Oral Capsule SR 24 Hr Take 1 capsule (30 mg total) by mouth daily. 30 capsule 0    [START ON 2/9/2025] amphetamine-dextroamphetamine ER (ADDERALL XR) 30 MG Oral Capsule SR 24 Hr Take 1 capsule (30 mg total) by mouth daily. 30 capsule 0    [START ON 12/13/2024] amphetamine-dextroamphetamine (ADDERALL) 30 MG Oral Tab Take 1 tablet (30 mg total) by mouth daily. 30 tablet 0    [START ON 1/11/2025] amphetamine-dextroamphetamine (ADDERALL) 30 MG Oral Tab Take 1 tablet (30 mg total) by mouth daily. 30 tablet 0    [START ON 2/9/2025] amphetamine-dextroamphetamine (ADDERALL) 30 MG Oral Tab Take 1 tablet (30 mg total) by mouth daily. 30 tablet 0    [START ON 12/31/2024] clonazePAM 2 MG Oral Tab Take 1 tablet (2 mg total) by mouth 3 (three) times daily. 90 tablet 2    clonazePAM 2 MG Oral Tab Take 1 tablet (2 mg total) by mouth 3  (three) times daily. 90 tablet 0    ibuprofen 600 MG Oral Tab Take 1 tablet (600 mg total) by mouth every 8 (eight) hours as needed for Pain or Fever. 30 tablet 0    cyclobenzaprine 10 MG Oral Tab Take 1 tablet (10 mg total) by mouth nightly as needed for Muscle spasms. 10 tablet 0    amphetamine-dextroamphetamine ER (ADDERALL XR) 30 MG Oral Capsule SR 24 Hr Take 1 capsule (30 mg total) by mouth daily. 30 capsule 0    amphetamine-dextroamphetamine (ADDERALL) 30 MG Oral Tab Take 1 tablet (30 mg total) by mouth daily. 30 tablet 0    sertraline (ZOLOFT) 100 MG Oral Tab Take 2 tablets (200 mg total) by mouth daily. 180 tablet 1        PMH:     Past Medical History:    ADHD    Anxiety    Was on and off but now it is more frequent    Asthma (HCC)    Back pain    Gout    Migraines    Polyarthralgia    Reactive depression       ROS:   Review of Systems   Constitutional:  Positive for activity change (activity changes related to lower back pain).   Respiratory: Negative.     Cardiovascular: Negative.    Genitourinary: Negative.    Musculoskeletal:  Positive for back pain and gait problem. Negative for myalgias, neck pain and neck stiffness.   Skin: Negative.             PHYSICAL EXAM:    /80   Pulse 103   Temp 99.3 °F (37.4 °C) (Temporal)   Resp 16   Ht 5' 7\" (1.702 m)   Wt 145 lb 12.8 oz (66.1 kg)   SpO2 99%   BMI 22.84 kg/m²   Physical Exam  Constitutional:       General: He is not in acute distress.     Appearance: Normal appearance. He is not ill-appearing.   Pulmonary:      Effort: Pulmonary effort is normal.      Breath sounds: Normal breath sounds.   Musculoskeletal:      Lumbar back: Spasms and tenderness present. No bony tenderness.   Skin:     Capillary Refill: Capillary refill takes less than 2 seconds.   Neurological:      Mental Status: He is alert.        ASSESSMENT/ PLAN:   1. Chronic bilateral low back pain with bilateral sciatica  Packet which includes stretches and light exercises provided to  patient. Discussed proper body mechanics. Strongly encouraged physical therapy given historical lower back pain. Pending imaging findings may need to see neurosurgery versus physiatry versus spine clinic. Discussed muscle relaxer prescribed in ED continue nightly in addition to warm compress and stretches.   - XR LUMBAR SPINE (MIN 4 VIEWS) (CPT=72110); Future    Health Maintenance Due   Topic Date Due    Annual Physical  Never done    DTaP,Tdap,and Td Vaccines (1 - Tdap) Never done    Annual Depression Screening  01/01/2024    COVID-19 Vaccine (4 - 2024-25 season) 09/01/2024    Influenza Vaccine (1) Never done     Pt indicates understanding and agrees to the plan.     Follow up as needed    SHIVANI Sarmiento          [1]   Allergies  Allergen Reactions    Penicillins HIVES

## 2024-12-23 ENCOUNTER — OFFICE VISIT (OUTPATIENT)
Dept: INTERNAL MEDICINE CLINIC | Facility: CLINIC | Age: 39
End: 2024-12-23
Payer: COMMERCIAL

## 2024-12-23 ENCOUNTER — SPINE CENTER NAVIGATION (OUTPATIENT)
Age: 39
End: 2024-12-23

## 2024-12-23 VITALS
HEIGHT: 67 IN | HEART RATE: 101 BPM | OXYGEN SATURATION: 98 % | BODY MASS INDEX: 23.57 KG/M2 | SYSTOLIC BLOOD PRESSURE: 108 MMHG | DIASTOLIC BLOOD PRESSURE: 76 MMHG | WEIGHT: 150.19 LBS

## 2024-12-23 DIAGNOSIS — G89.29 CHRONIC BILATERAL LOW BACK PAIN WITHOUT SCIATICA: Primary | ICD-10-CM

## 2024-12-23 DIAGNOSIS — M47.816 FACET ARTHROPATHY, LUMBAR: ICD-10-CM

## 2024-12-23 DIAGNOSIS — M54.50 CHRONIC BILATERAL LOW BACK PAIN WITHOUT SCIATICA: Primary | ICD-10-CM

## 2024-12-23 DIAGNOSIS — M51.379 DEGENERATIVE DISC DISEASE AT L5-S1 LEVEL: ICD-10-CM

## 2024-12-23 PROCEDURE — 3008F BODY MASS INDEX DOCD: CPT

## 2024-12-23 PROCEDURE — 3074F SYST BP LT 130 MM HG: CPT

## 2024-12-23 PROCEDURE — 99214 OFFICE O/P EST MOD 30 MIN: CPT

## 2024-12-23 PROCEDURE — 3078F DIAST BP <80 MM HG: CPT

## 2024-12-23 RX ORDER — HYDROCODONE BITARTRATE AND ACETAMINOPHEN 5; 325 MG/1; MG/1
1 TABLET ORAL EVERY 8 HOURS PRN
Qty: 5 TABLET | Refills: 0 | Status: SHIPPED | OUTPATIENT
Start: 2024-12-23

## 2024-12-23 NOTE — PROGRESS NOTES
Jose Ramon Huerta is a 39 year old male.   Chief Complaint   Patient presents with    Low Back Pain     Room 16B, [facility], pt has lower back pain for 20 years, getting worst.     HPI:    Patient here for follow up on lower back pain which he reports as ongoing for 15-20 years. He has had multiple work ups by various specialists which he reports was unrevealing and was told to trial physical therapy. He reports physical therapy was not an option financially for the patient. He has a 4 month old at home which he states low back pain now impacting holding her and lifting her. He reports pain is always present varies in intensities. At times can radiate down either leg. No change in function of urine or bowel movements. No numbness, tingling or weakness reported. Lower lumbar imaging revealed ddd of L5-S1and mild facet arthropathy on the left at L5-S1. Pain can vary from 3/10-10/10.     Allergies:  Allergies[1]   Current Meds:  Current Outpatient Medications   Medication Sig Dispense Refill    methylPREDNISolone (MEDROL) 4 MG Oral Tablet Therapy Pack As directed. (Patient not taking: Reported on 12/23/2024) 21 each 0    [START ON 2/9/2025] amphetamine-dextroamphetamine ER (ADDERALL XR) 30 MG Oral Capsule SR 24 Hr Take 1 capsule (30 mg total) by mouth daily. 30 capsule 0    [START ON 12/31/2024] clonazePAM 2 MG Oral Tab Take 1 tablet (2 mg total) by mouth 3 (three) times daily. 90 tablet 2    sertraline (ZOLOFT) 100 MG Oral Tab Take 2 tablets (200 mg total) by mouth daily. 180 tablet 1    amphetamine-dextroamphetamine ER (ADDERALL XR) 30 MG Oral Capsule SR 24 Hr Take 1 capsule (30 mg total) by mouth daily. 30 capsule 0    [START ON 1/11/2025] amphetamine-dextroamphetamine ER (ADDERALL XR) 30 MG Oral Capsule SR 24 Hr Take 1 capsule (30 mg total) by mouth daily. 30 capsule 0    amphetamine-dextroamphetamine (ADDERALL) 30 MG Oral Tab Take 1 tablet (30 mg total) by mouth daily. 30 tablet 0    [START ON 1/11/2025]  amphetamine-dextroamphetamine (ADDERALL) 30 MG Oral Tab Take 1 tablet (30 mg total) by mouth daily. 30 tablet 0    [START ON 2/9/2025] amphetamine-dextroamphetamine (ADDERALL) 30 MG Oral Tab Take 1 tablet (30 mg total) by mouth daily. 30 tablet 0    clonazePAM 2 MG Oral Tab Take 1 tablet (2 mg total) by mouth 3 (three) times daily. 90 tablet 0        PMH:     Past Medical History:    ADHD    Anxiety    Was on and off but now it is more frequent    Asthma (HCC)    Back pain    Gout    Migraines    Polyarthralgia    Reactive depression       ROS:   Review of Systems   Constitutional: Negative.    Respiratory: Negative.     Cardiovascular: Negative.    Gastrointestinal: Negative.    Genitourinary: Negative.    Musculoskeletal:  Positive for back pain.   Neurological: Negative.             PHYSICAL EXAM:    /76 (BP Location: Right arm, Patient Position: Sitting, Cuff Size: adult)   Pulse 101   Ht 5' 7\" (1.702 m)   Wt 150 lb 3.2 oz (68.1 kg)   SpO2 98%   BMI 23.52 kg/m²   Physical Exam  Constitutional:       General: He is not in acute distress.     Appearance: Normal appearance. He is not ill-appearing or toxic-appearing.   HENT:      Head: Normocephalic and atraumatic.   Pulmonary:      Effort: Pulmonary effort is normal.   Musculoskeletal:      Lumbar back: Tenderness present. No swelling, edema, deformity, signs of trauma or spasms. Normal range of motion. Positive right straight leg raise test and positive left straight leg raise test.   Skin:     Capillary Refill: Capillary refill takes less than 2 seconds.   Neurological:      Mental Status: He is alert.      Motor: Motor function is intact.      Coordination: Coordination is intact.      Gait: Gait is intact.        ASSESSMENT/ PLAN:   1. Chronic bilateral low back pain without sciatica  #5 tabs of norco sent related to pain management. Discussed sedation effects. No alcohol or driving with medication. Discussed spine center referral for quick turn  around. Call If symptoms change. Likely needing more advanced imaging and further work up.  - [facility] REFERRAL FOR NAVIGATION  - HYDROcodone-acetaminophen (NORCO) 5-325 MG Oral Tab; Take 1 tablet by mouth every 8 (eight) hours as needed for Pain.  Dispense: 5 tablet; Refill: 0  - Naloxone HCl 4 MG/0.1ML Nasal Liquid; 4 mg by Intranasal route as needed (May repeat as needed in other nostril if symptoms persist). If patient remains unresponsive, repeat dose in other nostril 2-5 minutes after first dose.  Dispense: 1 kit; Refill: 0    2. Degenerative disc disease at L5-S1 level  - [facility] REFERRAL FOR NAVIGATION  - HYDROcodone-acetaminophen (NORCO) 5-325 MG Oral Tab; Take 1 tablet by mouth every 8 (eight) hours as needed for Pain.  Dispense: 5 tablet; Refill: 0  - Naloxone HCl 4 MG/0.1ML Nasal Liquid; 4 mg by Intranasal route as needed (May repeat as needed in other nostril if symptoms persist). If patient remains unresponsive, repeat dose in other nostril 2-5 minutes after first dose.  Dispense: 1 kit; Refill: 0    3. Facet arthropathy, lumbar  - [facility] REFERRAL FOR NAVIGATION  - HYDROcodone-acetaminophen (NORCO) 5-325 MG Oral Tab; Take 1 tablet by mouth every 8 (eight) hours as needed for Pain.  Dispense: 5 tablet; Refill: 0  - Naloxone HCl 4 MG/0.1ML Nasal Liquid; 4 mg by Intranasal route as needed (May repeat as needed in other nostril if symptoms persist). If patient remains unresponsive, repeat dose in other nostril 2-5 minutes after first dose.  Dispense: 1 kit; Refill: 0      Health Maintenance Due   Topic Date Due    Annual Physical  Never done    Annual Depression Screening  01/01/2024    COVID-19 Vaccine (4 - 2024-25 season) 09/01/2024    Influenza Vaccine (1) Never done           Pt indicates understanding and agrees to the plan.     No follow-ups on file.    SHIVANI Sarmiento          [1]   Allergies  Allergen Reactions    Penicillins HIVES

## 2024-12-23 NOTE — PROGRESS NOTES
Spine Center Referral Navigation Encounter Note    Referred by: Celia PARRISH    Imaging: XR Lumbar Spine   If imaging done at an external facility, instructed patient to bring disc of MRI to appointment.     Previously Seen Spine Care Providers: Alex Behar, MD    Referred to: SHIVANI Terry in Pain Management     Information below is patient reported.     [facility]  Are you currently experiencing any of the following symptoms?      No    Is your condition due to an injury that occurred at work or is your care for this condition being coordinated by Worker's Compensation?      No    Are you looking for a second surgical opinion?      No    Have you had surgery on your spine (neck or back) in the last 12 months?      No    In the last several weeks, have you experienced weakness or balance issues that have caused falls or difficulty lifting your legs or feet (lower body) and/or weakness that has caused you to drop items or caused changes in handwriting (upper body)?      Lower body (Patient states he bumps into things a lot and feels this is not only due to pain but muscle weakness.)   Have you had an MRI of the lumbar spine (low back) in the last year?      No    : Patient needs to be seen by Surgical team. Does patient require a new visit or established visit?      New patient visit    Are you closer to [place] or [facility]?      [facility]         12/23- [facility] for patient requesting call back.  12/23-Patient called back and appointment was scheduled  
28-Dec-2023 16:17

## 2024-12-27 DIAGNOSIS — M47.816 FACET ARTHROPATHY, LUMBAR: ICD-10-CM

## 2024-12-27 DIAGNOSIS — M54.50 CHRONIC BILATERAL LOW BACK PAIN WITHOUT SCIATICA: ICD-10-CM

## 2024-12-27 DIAGNOSIS — M51.379 DEGENERATIVE DISC DISEASE AT L5-S1 LEVEL: ICD-10-CM

## 2024-12-27 DIAGNOSIS — G89.29 CHRONIC BILATERAL LOW BACK PAIN WITHOUT SCIATICA: ICD-10-CM

## 2024-12-27 RX ORDER — HYDROCODONE BITARTRATE AND ACETAMINOPHEN 5; 325 MG/1; MG/1
1 TABLET ORAL EVERY 8 HOURS PRN
Qty: 5 TABLET | Refills: 0 | Status: CANCELLED | OUTPATIENT
Start: 2024-12-27

## 2024-12-27 NOTE — TELEPHONE ENCOUNTER
LOV 12/23/24 for chronic bilateral low back pain. 5 tabs sent 12/23. Patient was also referred to spine center.     Celia Iverson- would you send in small refill?

## 2024-12-31 NOTE — TELEPHONE ENCOUNTER
We discussed in visit only a few would be sent as this is not a medication for him to depend on. He is also on a benzodiazepine. I would encourage warm compress, stretches that were provided and aleve PRN. He can discuss further management with spine clinic as scheduled.

## 2025-01-02 NOTE — H&P
[facility] - ORTHOPEDICS  [address] [place] [address], [address] [address]  [address] [place] [address]  [phone]     NEW PATIENT VISIT - HISTORY AND PHYSICAL EXAMINATION     Name: Jose Ramon Huerta   MRN: [id number]  Date: 1/3/2025     CC:   Chief Complaint   Patient presents with    Back Pain     Chronic low back pain         REFERRED BY: PCP office  PCP: Feliberto Lua MD    HPI:   Jose Ramon Huerta is a very pleasant 39 year old male who presents today for evaluation of neck and arm pain with back and leg pain. The distribution of symptoms are: 80% low back belt line pain and 20% intermittent right radicular leg pain. 80% neck pain with 20% bilateral intermittent radicular arm pain. The symptoms began 20 years ago in the low back and worsening with recent bending needs to care for his 4 month old and with standing/walking. 10 years ago with neck pain with intermittent arm pain symptoms that are worsening in the past year. Reports zapping pain down both arms with bilateral hand weakness that improves during the day. Started without any significant injury.  Has had 2 recent falls with last fall on 12/29/2024 while in the shower, attributes fall to pain with twisting and balance issues with glasses off in the shower. Since the onset, the symptoms have become slowly worse over time. Patient feels pain is aggravated by twisting, walking, standing, reaching and improved by slight improvement with steroid and norco. The patient reports denies numbness and + bilateral arm and leg weakness that improves during the day.  The symptom characteristics are as follows: constant ache, sharp, weakness.     Prior spine surgery: none.     Bowel and bladder symptoms: absent.  Fall/injury: 11/23/24 fall in shower and recently  12/29/2024 fall in shower with head injury. Denies LOC. Denies current head pain. Denies vision change.     The patient has had issues with balance and/or hand dexterity  problems such as changes in penmanship or the use of buttons or zippers. States hand dexterity is worse in the morning and improves during the day. Attributes to multi joint hand pain in the morning.     Treatment up to this time has included:  Evaluation: PMR in the past was treated for CTS in the right wrist  Rheumatology with no diagnosis in the past.   NSAIDS: have been partially helpful  Narcotic use: Norco with some relief  Physical therapy: in the past with no relief  Spinal injections: None    Orthopaedic New Pt Qnr       Question 12/27/2024 10:14 AM CST - Filed by Patient    I. History     A. When did your problem first occur? 8/1/2024    B. If problem is related to an injury, please describe how it occurred       C. Describe your symptom(s) Toothache style pain all the time, sharp stabbing pain on certain movements    D. What improves your symptom(s)? Laying down/resting    E. What makes your symptom(s) worse? Walking, sitting, standing for too long, cold weather    II. Pain (please select response and continue)     Are you currently having pain? Yes    III. Have you tried any of the following treatments?     Physical Therapy Yes    Was Physical Therapy effective? No    Anti-inflammatory medications Yes    A. Please list anti-inflammatory medications(s) taken Methylprednisolone    B. Was the anti-inflammatory medication(s) effective? Partial    Injections No    Acupuncture/Chiropractic treatment No    Steroids Yes    A. Please list prescribed Steroid medication Methylprednisolone    B. Was Steroid treatment(s) effective? Partial    Brace/Splint No    IV. Have you had any of the following studies performed?     X-ray Yes    MRI Yes    CT Yes    V. Do you use any of the following assistive devices to walk?     Cane No    Walker No    Rolling Walker No    Wheelchair No    Other devices       VI. Miscellaneous Information     Who do you live with? Spouse     Son/Daughter    Which is your dominant hand? Right           Pain Is Present       Question 12/27/2024 10:14 AM CST - Filed by Patient    A. Please describe your pain Aching     Sharp     Shooting    B. What is your pain level? (0=no pain 10=severe) 7    C. Please describe the frequency of your pain Constant    D. Does your pain wake you up at night? Yes    E. Do you have difficulty falling asleep due to your pain? Yes    Body Avatar               PMH:   Past Medical History:    ADHD    Anxiety    Was on and off but now it is more frequent    Asthma (HCC)    Back pain    Gout    Migraines    Polyarthralgia    Reactive depression       PAST SURGICAL HX:  Past Surgical History:   Procedure Laterality Date    Other surgical history  02/01/2020    right hand surgery       FAMILY HX:  Family History   Problem Relation Age of Onset    Other (Other) Father     Anemia Mother     Anxiety Mother     Personality Disorder Mother     No Known Problems Maternal Grandmother     No Known Problems Maternal Grandfather     No Known Problems Paternal Grandmother     No Known Problems Paternal Grandfather        ALLERGIES:  Penicillins    MEDICATIONS:   Current Outpatient Medications   Medication Sig Dispense Refill    Meloxicam 15 MG Oral Tab Take 1 tablet (15 mg total) by mouth daily. 30 tablet 0    HYDROcodone-acetaminophen (NORCO) 5-325 MG Oral Tab Take 1 tablet by mouth every 8 (eight) hours as needed for Pain. (Patient not taking: Reported on 1/3/2025) 5 tablet 0    Naloxone HCl 4 MG/0.1ML Nasal Liquid 4 mg by Intranasal route as needed (May repeat as needed in other nostril if symptoms persist). If patient remains unresponsive, repeat dose in other nostril 2-5 minutes after first dose. 1 kit 0    [START ON 2/9/2025] amphetamine-dextroamphetamine ER (ADDERALL XR) 30 MG Oral Capsule SR 24 Hr Take 1 capsule (30 mg total) by mouth daily. 30 capsule 0    clonazePAM 2 MG Oral Tab Take 1 tablet (2 mg total) by mouth 3 (three) times daily. 90 tablet 2    sertraline (ZOLOFT) 100 MG Oral  Tab Take 2 tablets (200 mg total) by mouth daily. 180 tablet 1       ROS: A comprehensive 14 point review of systems was performed and was negative aside from the aforementioned per history of present illness.    SOCIAL HX:  Social History     Tobacco Use    Smoking status: Never    Smokeless tobacco: Never   Substance Use Topics    Alcohol use: Not Currently     Comment: twice year socially       Lives with spouse and baby  Hobby: relax  Work:     PE:   Vitals:    01/03/25 1134   Weight: 150 lb (68 kg)   Height: 5' 7\" (1.702 m)     Estimated body mass index is 23.49 kg/m² as calculated from the following:    Height as of this encounter: 5' 7\" (1.702 m).    Weight as of this encounter: 150 lb (68 kg).    Physical Exam  Constitutional:       Appearance: Normal appearance.   HENT:      Head: Normocephalic and atraumatic.   Eyes:      Extraocular Movements: Extraocular movements intact.   Cardiovascular:      Pulses: Normal pulses. Skin warm and well perfused.  Pulmonary:      Effort: Pulmonary effort is normal. No respiratory distress.   Skin:     General: Skin is warm.   Psychiatric:         Mood and Affect: Mood normal.     Spine Exam:    Normal gait without difficulty  Able to heel, toe, tandem gait without difficulty  Level shoulders and hips in even stance    Restricted L-spine ROM, pain with rotation    + tenderness to palpation of L-spine bilateral PSIS and lower lumbar paraspinals    Straight leg raise test: negative    Sustained clonus: negative    LE Strength: 5/5 IP QUAD TA EHL GSC  LE Sensation: normal in L2-S1 distribution  LE reflexes: normal    Normal C-spine ROM, pain with extension in the neck    + tenderness to palpation of C-spine bilateral cervical paraspinals and C7 process area.     Spluring:Left + with pain radiating to the upper trap to the shoulder.     Tejada's: negative  IRR: negative  Sustained clonus: negative    UE Strength: 5/5 D Bi Tri WE FDS IO  UE Sensation: normal in  C5-T1 distribution  UE reflexes: normal    Radiographic Examination/Diagnostics:  xray personally viewed, independently interpreted and radiology report was reviewed.    Radiographs  Dated:12/05/25   Study:Lumbar spine   Demonstrates: Maintained lordosis. Decrease disc height L5-S1. No obvious fracture.     Cervical spine xray  01/03/25  Demonstrates: C4 on C5 spondylolisthesis appears unchanged with flexion, reduces with extension.    Stable approximate 2 mm anterior subluxation C4. When compared by radiology to 9/18/2022 cervical spine xray.     MRI cervical spine  Dated: 8/24/2022   Report reviewed     FINDINGS:    CERVICAL DISC LEVELS:  C2-C3:  No significant disc/facet abnormality, spinal stenosis, or foraminal narrowing.  C3-C4:  No significant disc/facet abnormality, spinal stenosis, or foraminal narrowing.  C4-C5:  No significant disc/facet abnormality, spinal stenosis, or foraminal narrowing.  C5-C6:  There is a left parasagittal broad based disc protrusion which causes mild impression on the ventral thecal sac but no significant stenosis of central canal or foramina.  C6-C7:  No significant disc/facet abnormality, spinal stenosis, or foraminal narrowing.  C7-T1:  No significant disc/facet abnormality, spinal stenosis, or foraminal narrowing.     CRANIOCERVICAL AREA:  Normal foramen magnum with no Chiari malformation.    PARASPINAL AREA:  Normal with no visible mass.    BONY STRUCTURES:  No fracture, pars defect, or osseous lesion.    CORD:  Normal caliber, contour, and signal intensity.       Impression   CONCLUSION:  Left parasagittal broad based disc protrusion at C5-C6 is noted.  There is no associated central canal or foraminal stenosis.              IMPRESSION: Jose Ramon Huerta is a 39 year old male with   1. Cervicalgia  - Physiatry Referral - In Network  - PHYSICAL THERAPY EXTERNAL  - Meloxicam 15 MG Oral Tab; Take 1 tablet (15 mg total) by mouth daily.  Dispense: 30 tablet; Refill: 0    2. Chronic  bilateral low back pain without sciatica  - Physiatry Referral - In Network  - PHYSICAL THERAPY EXTERNAL  - Meloxicam 15 MG Oral Tab; Take 1 tablet (15 mg total) by mouth daily.  Dispense: 30 tablet; Refill: 0    3. Spinal column pain  - Physiatry Referral - In Network  - PHYSICAL THERAPY EXTERNAL  - Meloxicam 15 MG Oral Tab; Take 1 tablet (15 mg total) by mouth daily.  Dispense: 30 tablet; Refill: 0      PLAN:     We reviewed the patients history, symptoms, exam findings, and imaging today.  We had a detailed discussion outlining the etiology, anatomy, pathophysiology, and natural history of neck and low back pain. The typical management of this condition may include lifestyle modification, NSAIDs, physical therapy, oral steroids, epidural injections, neuromodulatory medications.  Based on our discussion today we would like to have the patient initiate our recommendations for continued conservative therapy in the treatment of their condition noted in the assessment section.       -To start with physical therapy for the full spine. If not satisfied with His condition after completion of PT, then we will consider ordering advanced imaging at the follow up visit.  -Consider neurology visit for the described balance concern if no improvement with physical therapy. Currently no upper motor signs on exam.   -Home exercise handout provided  -Start meloxicam for pain. OTC pain medication list provided on AVS. Risks and benefits of the medications reviewed.   -Consider return to rheumatology for multi joint pain worse in the morning and improves over the day.   -Return to physiatry for interventional pain management needs.   -Go to ED if having vision change, or headache with recent fall.     FOLLOW-UP:  We will see him back in follow-up in 6 weeks, or sooner if any problems arise. Patient understands and agrees with plan.    SHIVANI Terry   Collaborative: Nando Hendricks MD  Orthopedic Spine Surgeon  [facility]  Orthopaedic Surgery   [address] [place] [address], [address], [place] [address]   t: [phone]   f: [phone]        This note was dictated using Dragon software.  While it was briefly proofread prior to completion, some grammatical, spelling, and word choice errors due to dictation may still occur.

## 2025-01-03 ENCOUNTER — HOSPITAL ENCOUNTER (OUTPATIENT)
Dept: GENERAL RADIOLOGY | Age: 40
Discharge: HOME | End: 2025-01-03
Attending: NURSE PRACTITIONER
Payer: COMMERCIAL

## 2025-01-03 ENCOUNTER — OFFICE VISIT (OUTPATIENT)
Facility: CLINIC | Age: 40
End: 2025-01-03
Payer: COMMERCIAL

## 2025-01-03 VITALS — BODY MASS INDEX: 23.54 KG/M2 | WEIGHT: 150 LBS | HEIGHT: 67 IN

## 2025-01-03 DIAGNOSIS — M54.50 CHRONIC BILATERAL LOW BACK PAIN WITHOUT SCIATICA: ICD-10-CM

## 2025-01-03 DIAGNOSIS — M54.9 SPINAL COLUMN PAIN: ICD-10-CM

## 2025-01-03 DIAGNOSIS — M54.2 CERVICALGIA: Primary | ICD-10-CM

## 2025-01-03 DIAGNOSIS — G89.29 CHRONIC BILATERAL LOW BACK PAIN WITHOUT SCIATICA: ICD-10-CM

## 2025-01-03 DIAGNOSIS — M54.2 CERVICALGIA: ICD-10-CM

## 2025-01-03 PROCEDURE — 3008F BODY MASS INDEX DOCD: CPT | Performed by: NURSE PRACTITIONER

## 2025-01-03 PROCEDURE — 99204 OFFICE O/P NEW MOD 45 MIN: CPT | Performed by: NURSE PRACTITIONER

## 2025-01-03 PROCEDURE — 72050 X-RAY EXAM NECK SPINE 4/5VWS: CPT | Performed by: NURSE PRACTITIONER

## 2025-01-03 RX ORDER — MELOXICAM 15 MG/1
15 TABLET ORAL DAILY
Qty: 30 TABLET | Refills: 0 | Status: SHIPPED | OUTPATIENT
Start: 2025-01-03

## 2025-02-03 ENCOUNTER — PATIENT MESSAGE (OUTPATIENT)
Dept: ORTHOPEDICS CLINIC | Facility: CLINIC | Age: 40
End: 2025-02-03

## 2025-02-03 DIAGNOSIS — G89.29 CHRONIC BILATERAL LOW BACK PAIN WITHOUT SCIATICA: ICD-10-CM

## 2025-02-03 DIAGNOSIS — M54.2 CERVICALGIA: Primary | ICD-10-CM

## 2025-02-03 DIAGNOSIS — M54.50 CHRONIC BILATERAL LOW BACK PAIN WITHOUT SCIATICA: ICD-10-CM

## 2025-02-03 DIAGNOSIS — M54.9 SPINAL COLUMN PAIN: ICD-10-CM

## 2025-02-10 ENCOUNTER — TELEPHONE (OUTPATIENT)
Facility: CLINIC | Age: 40
End: 2025-02-10

## 2025-02-10 ENCOUNTER — TELEPHONE (OUTPATIENT)
Dept: CASE MANAGEMENT | Age: 40
End: 2025-02-10

## 2025-02-10 NOTE — TELEPHONE ENCOUNTER
Patient reached out and stated that his insurance told him that provider needs to call [phone] to set up a peer to peer due to denial of his two MRI's.    Please advise.

## 2025-02-10 NOTE — TELEPHONE ENCOUNTER
Hello     We have received correspondence from the health plan regarding request for MRI cervical  and lumbar spine. The health plan had denied these request.     Clinical rationale: -Cervical     Your doctor told us that you have neck pain. Your doctor ordered several tests to be done at the same time. These tests are needed when certain criteria are met. We reviewed the notes we received. The notes show that each test, if ordered alone, meets these criteria. There should be a medical reason to have all these tests to be done at the same time. The notes do not show a specific reason why you need all these tests at the same time. Based on the information we have, we cannot approve this request as medically necessary. We suggest that you continue to see your doctor, to see what kind of tests might help you. We used [employer] ([employer]) Clinical Guideline titled Imaging of the Spine to make this decision. You may view this guideline at [email or web address]-guidelines-radiology.    Clinical rationale: lumbar spine    Your doctor told us that you have back pain. Your doctor ordered several tests to be done at the same time. These tests are needed when certain criteria are met. We reviewed the notes we received. The notes show that each test, if ordered alone, meets these criteria. There should be a medical reason to have all these tests to be done at the same time. The notes do not show a specific reason why you need all these tests at the same time. Based on the information we have, we cannot approve this request as medically necessary. We suggest that you continue to see your doctor, to see what kind of tests might help you. We used [employer] Clinical Guideline titled Imaging of the Spine to make this decision. You may view this guideline at [email or web address]/mbm-guidelines-radiology.    Additional information in media    You are welcome to complete a peer to peer   Ph.  [phone]  Case # [id number]           Patient is scheduled for 2.25.25. Please advise plan of care.     Thank you,  [place]   Referral specialist

## 2025-02-19 NOTE — TELEPHONE ENCOUNTER
Patient called to follow up on previous message on 2/10 regarding denial of mri's. Please advise.

## 2025-02-21 ENCOUNTER — OFFICE VISIT (OUTPATIENT)
Facility: CLINIC | Age: 40
End: 2025-02-21
Payer: COMMERCIAL

## 2025-02-21 ENCOUNTER — TELEPHONE (OUTPATIENT)
Facility: CLINIC | Age: 40
End: 2025-02-21

## 2025-02-21 VITALS — BODY MASS INDEX: 23.54 KG/M2 | HEIGHT: 67 IN | WEIGHT: 150 LBS

## 2025-02-21 DIAGNOSIS — M54.2 CERVICALGIA: Primary | ICD-10-CM

## 2025-02-21 DIAGNOSIS — G89.29 CHRONIC BILATERAL LOW BACK PAIN WITH LEFT-SIDED SCIATICA: ICD-10-CM

## 2025-02-21 DIAGNOSIS — M43.12 SPONDYLOLISTHESIS, CERVICAL REGION: ICD-10-CM

## 2025-02-21 DIAGNOSIS — M54.42 CHRONIC BILATERAL LOW BACK PAIN WITH LEFT-SIDED SCIATICA: ICD-10-CM

## 2025-02-21 PROCEDURE — 3008F BODY MASS INDEX DOCD: CPT | Performed by: NURSE PRACTITIONER

## 2025-02-21 PROCEDURE — 99214 OFFICE O/P EST MOD 30 MIN: CPT | Performed by: NURSE PRACTITIONER

## 2025-02-21 NOTE — PROGRESS NOTES
[facility] - ORTHOPEDICS  [address] [place] [address], [address], [place] [address]  [address] [place] [address]  [phone]     FOLLOW-UP PATIENT VISIT    Name: Jose Ramon Huerta   MRN: [id number]  Date: 2/21/2025     CC:   Chief Complaint   Patient presents with    Follow - Up     BL LBP/CERVICALGIA RE-EVAL AFTER PT         INTERVAL HISTORY:   Jose Ramon Huerta is a 39 year old male  follow-up patient whom I have been treating conservatively. Patient returns today for reevaluation of neck and arm pain with back and left leg pain. The distribution of symptoms are: 80% low back belt line pain and 20% intermittent right radicular leg pain and Left anterior thigh pain. States today pain is in the mid low back, left lateral gluteal area to the lateral left hip, left anterior thigh with numbness. 80% neck pain with 20% bilateral intermittent radicular arm pain. The symptoms began 20 years ago in the low back and worsening with recent bending needs to care for his 5 month old and with standing/walking. ADLs severely effected by pain and weakness. The patient reports 6-7/10 Pain today. Reports impaired quality of life, difficulty bending to lift child out of the crib due to LBP. Seeking next steps in care and review MRI denials.    Insurance denial: \"The notes show that each test, if ordered alone meets these criteria.\"    Last fall:  a couple weeks ago- slipped on the ice. No increase in back/neck pain. No LOC. Left elbow bruise and hit head. Reports no current head pain.     Follow up after PT that started on 1/8/2025 to present. Completed 10 sessions with worsening neck and low back pain.      Bowel and bladder symptoms: absent.    We have tried the following interventions thus far:   PT 1/8/2025-present= worsening pain  Meloxicam 1/3/2025 = no relief  Norco 12/23/2024    ROS: No fever/chills or other constitutional issues.    SOCIAL HX:  Unchanged since last visit    PE:   Vitals:     02/21/25 0852   Weight: 150 lb (68 kg)   Height: 5' 7\" (1.702 m)     Estimated body mass index is 23.49 kg/m² as calculated from the following:    Height as of this encounter: 5' 7\" (1.702 m).    Weight as of this encounter: 150 lb (68 kg).    On physical examination, he is awake, alert and oriented x 3 and in no acute distress. Mood, affect and language are normal. He appears well developed and well nourished.  He walks without a nonantalgic, nonmyelopathic, non-Trendelenburg gait. Motor strength testing of the upper extremities shows 5/5 strength in bilateral deltoids, biceps, triceps, FDS, interosseus.   Sensation is intact to light touch C5-T1 distributions bilaterally. Negative Tejada's bilaterally     Restricted L-spine ROM  + tenderness to palpation of L-spine  Straight leg raise test: negative- left low back pain that radiates to the lateral left hip  Sustained clonus: negative    LE Strength: 5/5 IP QUAD TA EHL GSC  LE Sensation: normal in L2-S1 distribution  LE reflexes: normal    Radiographic Examination/Diagnostics:  Xray/MRI personally viewed, independently interpreted and radiology report was reviewed.    Radiographs  Dated:12/05/25   Study:Lumbar spine   Demonstrates: Maintained lordosis. Decrease disc height L5-S1. No obvious fracture.      Cervical spine xray  01/03/25  Demonstrates: C4 on C5 spondylolisthesis appears unchanged with flexion, reduces with extension.    Stable approximate 2 mm anterior subluxation C4. When compared by radiology to 9/18/2022 cervical spine xray.      MRI cervical spine  Dated: 8/24/2022   Report reviewed     FINDINGS:    CERVICAL DISC LEVELS:  C2-C3:  No significant disc/facet abnormality, spinal stenosis, or foraminal narrowing.  C3-C4:  No significant disc/facet abnormality, spinal stenosis, or foraminal narrowing.  C4-C5:  No significant disc/facet abnormality, spinal stenosis, or foraminal narrowing.  C5-C6:  There is a left parasagittal broad based disc  protrusion which causes mild impression on the ventral thecal sac but no significant stenosis of central canal or foramina.  C6-C7:  No significant disc/facet abnormality, spinal stenosis, or foraminal narrowing.  C7-T1:  No significant disc/facet abnormality, spinal stenosis, or foraminal narrowing.     CRANIOCERVICAL AREA:  Normal foramen magnum with no Chiari malformation.    PARASPINAL AREA:  Normal with no visible mass.    BONY STRUCTURES:  No fracture, pars defect, or osseous lesion.    CORD:  Normal caliber, contour, and signal intensity.       Impression   CONCLUSION:  Left parasagittal broad based disc protrusion at C5-C6 is noted.  There is no associated central canal or foraminal stenosis.           IMPRESSION: Jose Ramon Huerta is a 39 year old male with  1. Cervicalgia- with bilateral hand numbness/pain and weakness  - To review possible injection therapy with Physiatry, MRI of the cervical spine is indicated at this time to review next step treatment options for ADL impairing pain.     2. Spondylolisthesis, cervical region  -As above    3. Chronic bilateral low back pain with left-sided sciatica  -Primary concern at this time is the moderate to severe Low back pain that prevents ADLs and impairing his quality of life. Advised MRI of the lumbar spine and indicated to review next step treatment options. To see physiatry to review possible injection therapy.     PLAN:   We had a detailed discussion outlining the etiology, anatomy, pathophysiology, and natural history of low back pain with left radiculopathy and neck pain with bilateral arm weakness/numbness. Failure of relief with 10 sessions of PT completed, no relief with medications attempted.     MRI of the cervical spine is indicated with failure of relief after greater than 6 weeks of physical therapy, impaired quality of life and continued radicular symptoms to the bilateral arms.     MRI of the lumbar spine is indicated with failure of relief after  greater than 6 weeks of physical therapy, impaired quality of life and need for next step treatment for pain.     Continue plan of care from last visit to see physiatry to review possible injection therapy. No current spine surgical indication, without completion of advanced imaging to review possible next steps in plan of care.     To comply with insurance advised to complete MRI of the lumbar spine first, and message after completion of the MRI of the lumbar spine to review for need for cervical spine MRI. Patient agrees to plan of care.     FOLLOW-UP:  We will see him back in follow-up in after updated images, or sooner if any problems arise. Patient understands and agrees with plan.    I spent 30 minutes in preparation to see the patient, counseling/education of relevant pathology, discussing imaging results, ordering medication/therapy intervention, and care coordination.      SHIVANI Terry  Collaborative: Nando Hendricks MD  Orthopedic Spine Surgeon  [facility]   [address] [place] [address]   t: [phone]   f: [phone]        This note was dictated using Dragon software.  While it was briefly proofread prior to completion, some grammatical, spelling, and word choice errors due to dictation may still occur.

## 2025-02-21 NOTE — TELEPHONE ENCOUNTER
Hello     Has the peer to peer been completed. The patient is scheduled for 2.25.25. If peer to peer is not complete the patient needs to reschedule or cancel.     Thank you,  [place]  Referral specialist

## 2025-02-25 ENCOUNTER — HOSPITAL ENCOUNTER (OUTPATIENT)
Dept: MRI IMAGING | Age: 40
Discharge: HOME | End: 2025-02-25
Attending: NURSE PRACTITIONER
Payer: COMMERCIAL

## 2025-02-25 DIAGNOSIS — M54.2 CERVICALGIA: ICD-10-CM

## 2025-02-25 DIAGNOSIS — M54.9 SPINAL COLUMN PAIN: ICD-10-CM

## 2025-02-25 DIAGNOSIS — G89.29 CHRONIC BILATERAL LOW BACK PAIN WITHOUT SCIATICA: ICD-10-CM

## 2025-02-25 DIAGNOSIS — M54.50 CHRONIC BILATERAL LOW BACK PAIN WITHOUT SCIATICA: ICD-10-CM

## 2025-02-25 PROCEDURE — 72148 MRI LUMBAR SPINE W/O DYE: CPT | Performed by: NURSE PRACTITIONER

## 2025-02-25 PROCEDURE — 72141 MRI NECK SPINE W/O DYE: CPT | Performed by: NURSE PRACTITIONER

## 2025-02-26 ENCOUNTER — PATIENT MESSAGE (OUTPATIENT)
Facility: CLINIC | Age: 40
End: 2025-02-26

## 2025-02-26 NOTE — TELEPHONE ENCOUNTER
Would you like for him to come back in to office to discuss    He is scheduled for PM & R on 3/4

## 2025-03-03 ENCOUNTER — HOSPITAL ENCOUNTER (EMERGENCY)
Facility: HOSPITAL | Age: 40
Discharge: HOME | End: 2025-03-03
Attending: EMERGENCY MEDICINE
Payer: COMMERCIAL

## 2025-03-03 ENCOUNTER — PATIENT MESSAGE (OUTPATIENT)
Dept: PHYSICAL MEDICINE AND REHAB | Facility: CLINIC | Age: 40
End: 2025-03-03

## 2025-03-03 VITALS
BODY MASS INDEX: 23.54 KG/M2 | HEART RATE: 72 BPM | HEIGHT: 67 IN | SYSTOLIC BLOOD PRESSURE: 108 MMHG | OXYGEN SATURATION: 97 % | TEMPERATURE: 99 F | DIASTOLIC BLOOD PRESSURE: 72 MMHG | RESPIRATION RATE: 18 BRPM | WEIGHT: 150 LBS

## 2025-03-03 DIAGNOSIS — R55 SYNCOPE, VASOVAGAL: Primary | ICD-10-CM

## 2025-03-03 DIAGNOSIS — M54.16 LUMBAR RADICULOPATHY: ICD-10-CM

## 2025-03-03 LAB
ALBUMIN SERPL-MCNC: 4.5 G/DL (ref 3.2–4.8)
ALBUMIN/GLOB SERPL: 1.6 {RATIO} (ref 1–2)
ALP LIVER SERPL-CCNC: 70 U/L
ALT SERPL-CCNC: 19 U/L
ANION GAP SERPL CALC-SCNC: 9 MMOL/L (ref 0–18)
AST SERPL-CCNC: 24 U/L (ref ?–34)
ATRIAL RATE: 84 BPM
BASOPHILS # BLD AUTO: 0.05 X10(3) UL (ref 0–0.2)
BASOPHILS NFR BLD AUTO: 0.7 %
BILIRUB SERPL-MCNC: 0.2 MG/DL (ref 0.3–1.2)
BUN BLD-MCNC: 17 MG/DL (ref 9–23)
CALCIUM BLD-MCNC: 9.3 MG/DL (ref 8.7–10.6)
CHLORIDE SERPL-SCNC: 104 MMOL/L (ref 98–112)
CO2 SERPL-SCNC: 28 MMOL/L (ref 21–32)
CREAT BLD-MCNC: 1.26 MG/DL
EGFRCR SERPLBLD CKD-EPI 2021: 74 ML/MIN/1.73M2 (ref 60–?)
EOSINOPHIL # BLD AUTO: 0.13 X10(3) UL (ref 0–0.7)
EOSINOPHIL NFR BLD AUTO: 1.9 %
ERYTHROCYTE [DISTWIDTH] IN BLOOD BY AUTOMATED COUNT: 14 %
GLOBULIN PLAS-MCNC: 2.8 G/DL (ref 2–3.5)
GLUCOSE BLD-MCNC: 86 MG/DL (ref 70–99)
HCT VFR BLD AUTO: 44.1 %
HGB BLD-MCNC: 15.1 G/DL
IMM GRANULOCYTES # BLD AUTO: 0.01 X10(3) UL (ref 0–1)
IMM GRANULOCYTES NFR BLD: 0.1 %
LYMPHOCYTES # BLD AUTO: 1.27 X10(3) UL (ref 1–4)
LYMPHOCYTES NFR BLD AUTO: 18.7 %
MCH RBC QN AUTO: 29.4 PG (ref 26–34)
MCHC RBC AUTO-ENTMCNC: 34.2 G/DL (ref 31–37)
MCV RBC AUTO: 86 FL
MONOCYTES # BLD AUTO: 0.6 X10(3) UL (ref 0.1–1)
MONOCYTES NFR BLD AUTO: 8.8 %
NEUTROPHILS # BLD AUTO: 4.73 X10 (3) UL (ref 1.5–7.7)
NEUTROPHILS # BLD AUTO: 4.73 X10(3) UL (ref 1.5–7.7)
NEUTROPHILS NFR BLD AUTO: 69.8 %
OSMOLALITY SERPL CALC.SUM OF ELEC: 293 MOSM/KG (ref 275–295)
P AXIS: 74 DEGREES
P-R INTERVAL: 152 MS
PLATELET # BLD AUTO: 191 10(3)UL (ref 150–450)
POTASSIUM SERPL-SCNC: 4.2 MMOL/L (ref 3.5–5.1)
PROT SERPL-MCNC: 7.3 G/DL (ref 5.7–8.2)
Q-T INTERVAL: 332 MS
QRS DURATION: 80 MS
QTC CALCULATION (BEZET): 392 MS
R AXIS: 60 DEGREES
RBC # BLD AUTO: 5.13 X10(6)UL
SODIUM SERPL-SCNC: 141 MMOL/L (ref 136–145)
T AXIS: 67 DEGREES
TROPONIN I SERPL HS-MCNC: <3 NG/L
VENTRICULAR RATE: 84 BPM
WBC # BLD AUTO: 6.8 X10(3) UL (ref 4–11)

## 2025-03-03 PROCEDURE — 85025 COMPLETE CBC W/AUTO DIFF WBC: CPT

## 2025-03-03 PROCEDURE — 80053 COMPREHEN METABOLIC PANEL: CPT

## 2025-03-03 PROCEDURE — 85025 COMPLETE CBC W/AUTO DIFF WBC: CPT | Performed by: EMERGENCY MEDICINE

## 2025-03-03 PROCEDURE — 93005 ELECTROCARDIOGRAM TRACING: CPT

## 2025-03-03 PROCEDURE — 80053 COMPREHEN METABOLIC PANEL: CPT | Performed by: EMERGENCY MEDICINE

## 2025-03-03 PROCEDURE — 99284 EMERGENCY DEPT VISIT MOD MDM: CPT

## 2025-03-03 PROCEDURE — 36415 COLL VENOUS BLD VENIPUNCTURE: CPT

## 2025-03-03 PROCEDURE — 84484 ASSAY OF TROPONIN QUANT: CPT

## 2025-03-03 PROCEDURE — 93010 ELECTROCARDIOGRAM REPORT: CPT

## 2025-03-03 PROCEDURE — 96372 THER/PROPH/DIAG INJ SC/IM: CPT

## 2025-03-03 PROCEDURE — 84484 ASSAY OF TROPONIN QUANT: CPT | Performed by: EMERGENCY MEDICINE

## 2025-03-03 RX ORDER — KETOROLAC TROMETHAMINE 30 MG/ML
30 INJECTION, SOLUTION INTRAMUSCULAR; INTRAVENOUS ONCE
Status: COMPLETED | OUTPATIENT
Start: 2025-03-03 | End: 2025-03-03

## 2025-03-03 RX ORDER — PREDNISONE 20 MG/1
40 TABLET ORAL ONCE
Status: COMPLETED | OUTPATIENT
Start: 2025-03-03 | End: 2025-03-03

## 2025-03-03 RX ORDER — METHYLPREDNISOLONE 4 MG/1
TABLET ORAL
Qty: 1 EACH | Refills: 0 | Status: SHIPPED | OUTPATIENT
Start: 2025-03-03 | End: 2025-03-04

## 2025-03-03 RX ORDER — TIZANIDINE 2 MG/1
2 TABLET ORAL EVERY 6 HOURS PRN
Qty: 21 TABLET | Refills: 0 | Status: SHIPPED | OUTPATIENT
Start: 2025-03-03 | End: 2025-03-10

## 2025-03-03 NOTE — ED INITIAL ASSESSMENT (HPI)
Pt here with c/o low back pain, hx of degen disc disease.   Pt states pain was so bad today he fainted,

## 2025-03-04 ENCOUNTER — TELEPHONE (OUTPATIENT)
Dept: PHYSICAL MEDICINE AND REHAB | Facility: CLINIC | Age: 40
End: 2025-03-04

## 2025-03-04 ENCOUNTER — OFFICE VISIT (OUTPATIENT)
Dept: PHYSICAL MEDICINE AND REHAB | Facility: CLINIC | Age: 40
End: 2025-03-04
Payer: COMMERCIAL

## 2025-03-04 DIAGNOSIS — M54.16 LUMBAR RADICULOPATHY: Primary | ICD-10-CM

## 2025-03-04 DIAGNOSIS — M51.372 DEGENERATION OF INTERVERTEBRAL DISC OF LUMBOSACRAL REGION WITH DISCOGENIC BACK PAIN AND LOWER EXTREMITY PAIN: ICD-10-CM

## 2025-03-04 DIAGNOSIS — M48.061 LUMBAR FORAMINAL STENOSIS: ICD-10-CM

## 2025-03-04 DIAGNOSIS — M54.16 LUMBAR RADICULOPATHY: ICD-10-CM

## 2025-03-04 DIAGNOSIS — M51.369 ANNULAR TEAR OF LUMBAR DISC: ICD-10-CM

## 2025-03-04 DIAGNOSIS — M47.816 LUMBAR SPONDYLOSIS: ICD-10-CM

## 2025-03-04 DIAGNOSIS — M54.59 MECHANICAL LOW BACK PAIN: ICD-10-CM

## 2025-03-04 DIAGNOSIS — M51.369 BULGE OF LUMBAR DISC WITHOUT MYELOPATHY: ICD-10-CM

## 2025-03-04 DIAGNOSIS — M47.816 FACET SYNDROME, LUMBAR: Primary | ICD-10-CM

## 2025-03-04 PROCEDURE — 99214 OFFICE O/P EST MOD 30 MIN: CPT | Performed by: PHYSICAL MEDICINE & REHABILITATION

## 2025-03-04 RX ORDER — GABAPENTIN 100 MG/1
100 CAPSULE ORAL 3 TIMES DAILY
Qty: 90 CAPSULE | Refills: 0 | Status: SHIPPED | OUTPATIENT
Start: 2025-03-04

## 2025-03-04 RX ORDER — DICLOFENAC SODIUM 75 MG/1
75 TABLET, DELAYED RELEASE ORAL 2 TIMES DAILY
Qty: 60 TABLET | Refills: 1 | Status: SHIPPED | OUTPATIENT
Start: 2025-03-04

## 2025-03-04 NOTE — TELEPHONE ENCOUNTER
Initiated authorization for LEFT L5 and LEFt S1 TFESI with oral sedation. CPT/HCPCS 94975, [address]25 dx:M54.16 to be done at [facility] with [employer] portal.    Status: Approved  Reference/Authorization # [id number]  Valid: 03/04/2025 - 04/02/2025  Authorization is not a guarantee of payment and may be subject to review once claim is submitted. '

## 2025-03-04 NOTE — PATIENT INSTRUCTIONS
1) My office will call you to schedule the LEFT L5 and LEFt S1 TFESI under local anesthesia and oral sedation once the procedure is approved by your insurance carrier.    2) Follow up with me 2-4 weeks after the procedure. This can be in the office or virtually.   3) Start gabapentin 100 mg three times per day. If limited improvement, then would increase to 200 mg three times per day.  4) Tylenol 500-1000 mg every 6-8 hours as needed for pain.  No more than 3000 mg daily.  5) Take Diclofenac 75 mg 1 tablet twice per day with food for the next two weeks and then as needed but no more than 2 tablets per day. Do not take with any other NSAIDS (Ibuprofen, Advil, Aleve, Naprosyn etc). OK to take Tylenol 500 mg every 6 hours as needed for pain. If you develop any side effects including stomach aches, nausea, vomiting, or other gastrointestinal symptoms, stop the medication and call my office.

## 2025-03-04 NOTE — PROGRESS NOTES
[facility]  Progress Note    CHIEF COMPLAINT:    Chief Complaint   Patient presents with    Follow - Up     LOV 3/21/23. Pt presents for chronic back, neck pain/bilateral carpal tunnel. Back pain is in L5-S1. Pain 5/10, radiates down both feet. Pain was so intense this past week that pt went to ER yesterday, received injection. Admits N/T, weakness in legs. Pt is taking no pain meds. No recent tx. MRI L, C spine 2/25/25.       History of Present Illness:  The patient is a 39 year old right-handed male with a significant past medical history of ADHD and anxiety  who presents for follow up with complaints of midline and left-sided low back pain with radiation down the left leg.  He rates his pain a constant 5 out of 10.  He associates this with tingling in the left foot and weakness in the left leg.  He was seen in the emergency room yesterday where they gave him oral steroids and a Toradol injection.  He does feel that his symptoms improved with this.  He did have an MRI of the cervical and lumbar spine as ordered by orthopedic surgery.  I have reviewed those notes as well as the images with him today.    PAST MEDICAL HISTORY:  Past Medical History:    ADHD    Anxiety    Was on and off but now it is more frequent    Asthma (HCC)    Back pain    Gout    Migraines    Polyarthralgia    Reactive depression       SURGICAL HISTORY:  Past Surgical History:   Procedure Laterality Date    Other surgical history  02/01/2020    right hand surgery       SOCIAL HISTORY:   Social History     Occupational History    Not on file   Tobacco Use    Smoking status: Never    Smokeless tobacco: Never   Vaping Use    Vaping status: Never Used   Substance and Sexual Activity    Alcohol use: Not Currently     Comment: twice year socially    Drug use: Never    Sexual activity: Not on file       FAMILY HISTORY:   Family History   Problem Relation Age of Onset    Other (Other) Father     Anemia  Mother     Anxiety Mother     Personality Disorder Mother     No Known Problems Maternal Grandmother     No Known Problems Maternal Grandfather     No Known Problems Paternal Grandmother     No Known Problems Paternal Grandfather        CURRENT MEDICATIONS:   Current Outpatient Medications   Medication Sig Dispense Refill    gabapentin 100 MG Oral Cap Take 1 capsule (100 mg total) by mouth 3 (three) times daily. 90 capsule 0    diclofenac 75 MG Oral Tab EC Take 1 tablet (75 mg total) by mouth 2 (two) times daily. Take with food for 2 weeks as directed and then as needed. 60 tablet 1    tiZANidine 2 MG Oral Tab Take 1 tablet (2 mg total) by mouth every 6 (six) hours as needed. 21 tablet 0    amphetamine-dextroamphetamine ER 30 MG Oral Capsule SR 24 Hr Take 1 capsule (30 mg total) by mouth every morning. 30 capsule 0    Naloxone HCl 4 MG/0.1ML Nasal Liquid 4 mg by Intranasal route as needed (May repeat as needed in other nostril if symptoms persist). If patient remains unresponsive, repeat dose in other nostril 2-5 minutes after first dose. 1 kit 0    amphetamine-dextroamphetamine ER (ADDERALL XR) 30 MG Oral Capsule SR 24 Hr Take 1 capsule (30 mg total) by mouth daily. 30 capsule 0    clonazePAM 2 MG Oral Tab Take 1 tablet (2 mg total) by mouth 3 (three) times daily. 90 tablet 2    sertraline (ZOLOFT) 100 MG Oral Tab Take 2 tablets (200 mg total) by mouth daily. 180 tablet 1       ALLERGIES:   Allergies[1]    REVIEW OF SYSTEMS:   Review of Systems   Constitutional: Negative.    HENT: Negative.    Eyes: Negative.    Respiratory: Negative.    Cardiovascular: Negative.    Gastrointestinal: Negative.    Genitourinary: Negative.    Musculoskeletal: As per HPI  Skin: Negative.    Neurological: As per HPI  Endo/Heme/Allergies: Negative.    Psychiatric/Behavioral: Negative.      All other systems reviewed and are negative. Pertinent positives and negatives noted in the HPI.        PHYSICAL EXAM:   There were no vitals taken  for this visit.    There is no height or weight on file to calculate BMI.      General: No immediate distress  Head: Normocephalic/ Atraumatic  Eyes: Extra-occular movements intact.   Ears: No auricular hematoma or deformities  Mouth: No lesions or ulcerations  Heart: peripheral pulses intact. Normal capillary refill.   Lungs: Non-labored respirations  Abdomen: No abdominal guarding  Extremities: No lower extremity edema bilaterally   Skin: No lesions noted.   Cognition: alert & oriented x 3, attentive, able to follow 2 step commands, comprehension intact, spontaneous speech intact  Motor:    Musculoskeletal:    LUMBAR SPINE:  Inspection: no erythema, swelling, or obvious deformity.  Their iliac crest and shoulder heights are symmetrical.  Postural exam reveals no scoliosis or kyphosis.  Palpation: Tender to palpation over the left lower lumbar facet and paraspinal, left glutes and piriformis  ROM: Full active range of motion of the lumbar spine  Motor: 5/5 in all myotomes of the BILATERAL lower extremities   Sensation: Intact to light touch in all dermatomes of the lower extremities   Reflexes: 2/4 at L4 and S1  Facet Loading: Negative  Straight leg raise: negative for radicular pain symptoms  Slump test: Positive on the left for radicular symptoms    Data  Admission on 03/03/2025, Discharged on 03/03/2025   Component Date Value Ref Range Status    Ventricular rate 03/03/2025 84  BPM Final    Atrial rate 03/03/2025 84  BPM Final    P-R Interval 03/03/2025 152  ms Final    QRS Duration 03/03/2025 80  ms Final    Q-T Interval 03/03/2025 332  ms Final    QTC Calculation (Bezet) 03/03/2025 392  ms Final    P Axis 03/03/2025 74  degrees Final    R Axis 03/03/2025 60  degrees Final    T Axis 03/03/2025 67  degrees Final    WBC 03/03/2025 6.8  4.0 - 11.0 x10(3) uL Final    RBC 03/03/2025 5.13  4.30 - 5.70 x10(6)uL Final    HGB 03/03/2025 15.1  13.0 - 17.5 g/dL Final    HCT 03/03/2025 44.1  39.0 - 53.0 % Final    PLT  03/03/2025 191.0  150.0 - 450.0 10(3)uL Final    MCV 03/03/2025 86.0  80.0 - 100.0 fL Final    MCH 03/03/2025 29.4  26.0 - 34.0 pg Final    MCHC 03/03/2025 34.2  31.0 - 37.0 g/dL Final    RDW 03/03/2025 14.0  % Final    Neutrophil Absolute Prelim 03/03/2025 4.73  1.50 - 7.70 x10 (3) uL Final    Neutrophil Absolute 03/03/2025 4.73  1.50 - 7.70 x10(3) uL Final    Lymphocyte Absolute 03/03/2025 1.27  1.00 - 4.00 x10(3) uL Final    Monocyte Absolute 03/03/2025 0.60  0.10 - 1.00 x10(3) uL Final    Eosinophil Absolute 03/03/2025 0.13  0.00 - 0.70 x10(3) uL Final    Basophil Absolute 03/03/2025 0.05  0.00 - 0.20 x10(3) uL Final    Immature Granulocyte Absolute 03/03/2025 0.01  0.00 - 1.00 x10(3) uL Final    Neutrophil % 03/03/2025 69.8  % Final    Lymphocyte % 03/03/2025 18.7  % Final    Monocyte % 03/03/2025 8.8  % Final    Eosinophil % 03/03/2025 1.9  % Final    Basophil % 03/03/2025 0.7  % Final    Immature Granulocyte % 03/03/2025 0.1  % Final    Glucose 03/03/2025 86  70 - 99 mg/dL Final    Sodium 03/03/2025 141  136 - 145 mmol/L Final    Potassium 03/03/2025 4.2  3.5 - 5.1 mmol/L Final    Chloride 03/03/2025 104  98 - 112 mmol/L Final    CO2 03/03/2025 28.0  21.0 - 32.0 mmol/L Final    Anion Gap 03/03/2025 9  0 - 18 mmol/L Final    BUN 03/03/2025 17  9 - 23 mg/dL Final    Creatinine 03/03/2025 1.26  0.70 - 1.30 mg/dL Final    Calcium, Total 03/03/2025 9.3  8.7 - 10.6 mg/dL Final    Calculated Osmolality 03/03/2025 293  275 - 295 mOsm/kg Final    eGFR-Cr 03/03/2025 74  >=60 mL/min/1.73m2 Final    AST 03/03/2025 24  <34 U/L Final    ALT 03/03/2025 19  10 - 49 U/L Final    Alkaline Phosphatase 03/03/2025 70  45 - 117 U/L Final    Bilirubin, Total 03/03/2025 0.2 (L)  0.3 - 1.2 mg/dL Final    Total Protein 03/03/2025 7.3  5.7 - 8.2 g/dL Final    Albumin 03/03/2025 4.5  3.2 - 4.8 g/dL Final    Globulin  03/03/2025 2.8  2.0 - 3.5 g/dL Final    A/G Ratio 03/03/2025 1.6  1.0 - 2.0 Final    Troponin I (High Sensitivity)  03/03/2025 <3  <=53 ng/L Final   Admission on 12/02/2024, Discharged on 12/02/2024   Component Date Value Ref Range Status    Urine Color 12/02/2024 Yellow  Yellow Final    Clarity Urine 12/02/2024 Clear  Clear Final    Spec Gravity 12/02/2024 1.010  1.005 - 1.030 Final    Glucose Urine 12/02/2024 Negative  Negative mg/dL Final    Bilirubin Urine 12/02/2024 Negative  Negative Final    Ketones Urine 12/02/2024 Negative  Negative mg/dL Final    Blood Urine 12/02/2024 Negative  Negative Final    pH Urine 12/02/2024 5.5  5.0 - 8.0 Final    Protein Urine 12/02/2024 Negative  Negative mg/dL Final    Urobilinogen Urine 12/02/2024 0.2  <2.0 mg/dL Final    Nitrite Urine 12/02/2024 Negative  Negative Final    Leukocyte Esterase Urine 12/02/2024 Negative  Negative Final    Microscopic 12/02/2024 Microscopic not indicated   Final   ]      Radiology Imaging:  I reviewed with the patient his MRI of the cervical spine and lumbar spine  MRI SPINE LUMBAR (CPT=72148)  Narrative: PROCEDURE:  MRI SPINE LUMBAR (CPT=72148)     COMPARISON:  [facility], [facility], MRI SPINE CERVICAL (CPT=72141), 8/24/2022, 7:30 PM.     INDICATIONS:  M54.9 Spinal column pain G89.29 Chronic bilateral low back pain without sciatica M54.50 Chronic bilateral low back pain without sciatica M54.2 Cervicalgia     TECHNIQUE:  Multiplanar T1 and T2 weighted images including fat suppression sequences.  Images acquired in sagittal and axial planes.       PATIENT STATED HISTORY: (As transcribed by Technologist)  Patient complains of chronic neck and lower back pain with pain in his arms and bilateral legs           FINDINGS:    LUMBAR DISC LEVELS  L1-L2:  No significant disc/facet abnormality, spinal stenosis, or foraminal narrowing.  L2-L3:  Mild disc desiccation and diffuse disc bulge.  No significant central canal or neural foraminal stenosis.  L3-L4:  No significant disc/facet abnormality, spinal stenosis, or foraminal narrowing.  L4-L5:  No significant disc/facet  abnormality, spinal stenosis, or foraminal narrowing.  L5-S1:  Disc desiccation, posterior annular fissuring, mild disc height loss, and small left paracentral focal disc protrusion measuring 9 x 6 x 7 mm.  This superficially contacts the descending left S1 nerve root without displacement (series 1502, image   3).  No significant central canal or neural foraminal stenosis.     PARASPINAL AREA:  Normal with no visible mass.    BONY STRUCTURES:  No fracture, pars defect, significant scoliosis, or osseous lesion.    CORD/CAUDA EQUINA:  Normal caliber, contour, and signal intensity.                     Impression: CONCLUSION:       At the L5-S1 level there is a combination of disc desiccation, posterior annular fissuring, mild disc height loss, and small left paracentral focal disc protrusion.  The disc protrusion superficially contacts the descending left S1 nerve root without   displacement.  No significant central canal or neural foraminal stenosis throughout the lumbar spine.        LOCATION:  [facility]        Dictated by (CST): Tico Paez MD on 2/26/2025 at 8:15 AM       Finalized by (CST): Tico Paez MD on 2/26/2025 at 8:22 AM     MRI SPINE CERVICAL (CPT=72141)  Narrative: PROCEDURE:  MRI SPINE CERVICAL (CPT=72141)     COMPARISON:  [facility], MRI SPINE CERVICAL (CPT=72141), 8/24/2022, 7:30 PM.     INDICATIONS:  M54.9 Spinal column pain G89.29 Chronic bilateral low back pain without sciatica M54.50 Chronic bilateral low back pain without sciatica M54.2 Cervicalgia     TECHNIQUE:  Multiplanar T1 and T2 weighted images including fat suppression sequences.  Images acquired in sagittal and axial planes.       PATIENT STATED HISTORY: (As transcribed by Technologist)  Patient complains of chronic neck and lower back pain with pain in his arms and bilateral legs          FINDINGS:    CERVICAL DISC LEVELS:  C2-C3:  No significant disc/facet abnormality, spinal stenosis, or foraminal narrowing.  C3-C4:  No significant  disc/facet abnormality, spinal stenosis, or foraminal narrowing.  C4-C5:  No significant disc/facet abnormality, spinal stenosis, or foraminal narrowing.  C5-C6:  Posterior central disc extrusion eccentric to the left, the extruded disc measuring approximately 9 x 5 mm in cross-section over a 10 mm craniocaudal length (series 702, image 40, series 402, image 9).  This abuts the ventral thecal sac without   significant central canal stenosis.  The extruded disc contributes to mild left foraminal stenosis.  C6-C7:  No significant disc/facet abnormality, spinal stenosis, or foraminal narrowing.  C7-T1:  No significant disc/facet abnormality, spinal stenosis, or foraminal narrowing.     CRANIOCERVICAL AREA:  Normal foramen magnum with no Chiari malformation.    PARASPINAL AREA:  No epidural or paraspinal mass or fluid collection.  Incidentally noted complete opacification of the right maxillary sinus..    BONY STRUCTURES:  No fracture, pars defect, or osseous lesion.    CORD:  Normal caliber, contour, and signal intensity.                     Impression: CONCLUSION:       1. Posterior central disc extrusion at the C5-6 level contributes to mild left-sided foraminal stenosis.  No significant central canal stenosis at this level or elsewhere within the cervical spine.     2. Normal caliber, contour, and signal intensity of the cervical spinal cord.       3. Complete opacification of the right maxillary sinus.          LOCATION:  [facility]        Dictated by (CST): Tico Paez MD on 2/26/2025 at 8:10 AM       Finalized by (CST): Tico Paez MD on 2/26/2025 at 8:14 AM         ASSESSMENT AND PLAN:  Jose Ramon is a pleasant 39-year-old male presents with left-sided low back pain with radiation down the left leg due to a lumbar radiculitis.  I reviewed his MRI of the lumbar spine where he does have an annular tear at L5-S1 with a paracentral protrusion contacting the left S1 nerve root.  I am recommending a left L5 and left S1  transforaminal epidural steroid injection under local anesthesia with oral sedation.  I will also start him on gabapentin, diclofenac, and he will use Tylenol for pain.  He will follow-up with me in 2 to 4 weeks.       RTC in 2 to 4 weeks after procedure  Discharge Instructions were provided as documented in AVS summary.  The patient was in agreement with the assessment and plan.  All questions were answered.  There were no barriers to learning.         1. Facet syndrome, lumbar    2. Mechanical low back pain    3. Lumbar spondylosis    4. Degeneration of intervertebral disc of lumbosacral region with discogenic back pain and lower extremity pain    5. Lumbar foraminal stenosis    6. Bulge of lumbar disc without myelopathy    7. Lumbar radiculopathy    8. Annular tear of lumbar disc        Alex B. Behar MD  Physical Medicine and Rehabilitation/Sports Medicine  [address] [employer] Notice to Patient: Medical documents like this are made available to patients in the interest of transparency. However, be advised this is a medical document and it is intended as [id number] communication between your medical providers. This medical document may contain abbreviations, assessments, medical data, and results or other terms that are unfamiliar. Medical documents are intended to carry relevant information, facts as evident, and the clinical opinion of the practitioner. As such, this medical document may be written in language that appears blunt or direct. You are encouraged to contact your medical provider and/or [facility] if you have any questions about this medical document.        [1]   Allergies  Allergen Reactions    Penicillins HIVES      no

## 2025-03-05 NOTE — TELEPHONE ENCOUNTER
Patient has been scheduled for LEFT L5 and LEFt S1 Transforaminal epidural steroid injection with oral sedation on 04/02/2025 at the [facility] with Dr. Behar.   Anesthesia type: PO sedation  Please note: The [facility] will call the business day prior to discuss the exact time/arrival and additional instructions for your appointment.  Patient was advised that if he/she does receive the covid vaccine it needs to be at least 2 weeks before or after the injection.  Medications and allergies reviewed.  Educated to hold NSAIDS (Aleve, Ibuprofen, Motrin, Advil) and anti-inflammatories (Meloxicam, Naproxen, Diclofenac, Celebrex) and for cervical injections must hold Multi-Vitamins, Vitamin E, Fish Oil/Omega-3.  Patient informed of [facility]'s  policy:  The patient will require transportation arrangements to and from the procedure, with the  present on site for the entire visit.  Without a , the appointment is subject to cancellation.    [facility] is located in the [facility] 1st floor [address], [place] [address].   may park in the yellow/purple parking lot.  Patient verbalized understanding and agrees with plan.  Scheduled in Epic: Yes  Scheduled in Surgical Case: Yes  Follow up appointment made: NOV: 5/6/2025 Behar, Alex, MD

## 2025-03-05 NOTE — ED PROVIDER NOTES
Patient Seen in: [facility]      History     Chief Complaint   Patient presents with    Back Pain     Stated Complaint: lowe back pain, chronic pain, no injury    Subjective:   HPI      Patient here for evaluation of a syncopal episode.  He has a history of chronic lower back pain.  This morning when he got up he had significant pain in the in the mid to this he felt lightheaded and fell.  No head trauma no LOC denies any new neck or back pain.  Denies any numbness or weakness.        Objective:     Past Medical History:    ADHD    Anxiety    Was on and off but now it is more frequent    Asthma (HCC)    Back pain    Gout    Migraines    Polyarthralgia    Reactive depression              Past Surgical History:   Procedure Laterality Date    Other surgical history  02/01/2020    right hand surgery                Social History     Socioeconomic History    Marital status:    Tobacco Use    Smoking status: Never    Smokeless tobacco: Never   Vaping Use    Vaping status: Never Used   Substance and Sexual Activity    Alcohol use: Not Currently     Comment: twice year socially    Drug use: Never   Other Topics Concern    Caffeine Concern Yes     Comment: Coffee: 1-2 cups    Exercise No   Social History Narrative    The patient does not use an assistive device..      The patient does live in a home with stairs.     Social Drivers of Health      [facility]    Housing Stability                  Physical Exam     ED Triage Vitals [03/03/25 1120]   /80   Pulse 104   Resp 18   Temp 98.6 °F (37 °C)   Temp src    SpO2 98 %   O2 Device None (Room air)       Current Vitals:   No data recorded    Physical Exam  Physical Exam   Constitutional: Awake, alert, well appearing  Head: Normocephalic and atraumatic.   Eyes: Conjunctivae are normal. Pupils are equal, round, and reactive to light.   Neck: Normal range of motion. No  JVD  Cardiovascular: Normal rate, regular rhythm  Pulmonary/Chest: Normal effort.  No accessory muscle use.  No cyanosis.  Abdominal: Soft. Not distended.  Neurological: Pt is alert and oriented to person, place, and time. no cranial nerve deficits. Speech fluent      Lungs clear no murmurs    No midline spinal tenderness along cervical, thoracic, lumbar, sacral spine.  No pain to percussion.  5 out of 5 strength with bilateral hip flexion, hip extension, knee flexion, knee extension.  5 out of 5 strength with plantar flexion and dorsiflexion of ankle.  5 out of 5 strength with bilateral EHL.    Babinski's downgoing bilaterally.    ED Course     Labs Reviewed   COMP METABOLIC PANEL (14) - Abnormal; Notable for the following components:       Result Value    Bilirubin, Total 0.2 (*)     All other components within normal limits   TROPONIN I HIGH SENSITIVITY - Normal   CBC WITH DIFFERENTIAL WITH PLATELET     EKG    Rate, intervals and axes as noted on EKG Report.  Rate: 84  Rhythm: Sinus Rhythm  Reading: Sinus rhythm no acute ischemia                Blood work reviewed, reassuring           MDM          Differential diagnoses considered: Orthostatic syncope, vasovagal syncope favored, less likely cardiac syncope due to AV block.    -Work appears reassuring    -Conservative treatment for back pain continue to follow-up with a specialist    *Imaging of the lower back such as MRI of the lower back was considered however the patient had 1 done just last week so this was deferred.  He also has no neurological deficits on exam.    *Discussion of ongoing management of this patient's care included: n/a  *Comorbidities contributing to the complexity of decision making: n/a  *External charts reviewed: Outpatient MRI from 2/25/2025 reviewed, he does have a disc protrusion touching the left S1 nerve root without displacement, no signs of cauda equina on this MRI.  *Additional sources of history: n/a    Shared decision making was  done by: patient, myself.          Medical Decision Making      Disposition and Plan     Clinical Impression:  1. Syncope, vasovagal    2. Lumbar radiculopathy         Disposition:  Discharge  3/3/2025  2:40 pm    Follow-up:  Feliberto Lua MD  [address] [place] [address]  [phone]    Follow up            Medications Prescribed:  Discharge Medication List as of 3/3/2025  3:02 PM        START taking these medications    Details   methylPREDNISolone 4 MG Oral Tablet Therapy Pack Dosepack: use as directed on packaging, Normal, Disp-1 each, R-0      tiZANidine 2 MG Oral Tab Take 1 tablet (2 mg total) by mouth every 6 (six) hours as needed., Normal, Disp-21 tablet, R-0                 Supplementary Documentation:

## 2025-03-05 NOTE — TELEPHONE ENCOUNTER
LVMTCB - 2nd attempt   (Phone rang <1x and went to voicemail)    Attempted to call second phone on file, rang 1x and when to voicemail

## 2025-03-24 RX ORDER — GABAPENTIN 100 MG/1
100 CAPSULE ORAL 3 TIMES DAILY
Qty: 90 CAPSULE | Refills: 0 | Status: SHIPPED | OUTPATIENT
Start: 2025-03-24

## 2025-03-24 NOTE — TELEPHONE ENCOUNTER
Refill Request    Medication request: gabapentin 100 MG Oral Cap.  Take 1 capsule (100 mg total) by mouth 3 (three) times daily.      LOV:3/4/2025 Behar, Alex, MD   Due back to clinic per last office note:  RTC in 2 to 4 weeks after procedure   NOV: 5/6/2025 Behar, Alex, [place]      [place] 3/4/25 #90 (30 days)    Urine drug screen (if applicable): N/A  Pain contract: N/A    LOV plan (if weaning or changing medications): I will also start him on gabapentin, diclofenac, and he will use Tylenol for pain.

## 2025-04-02 PROBLEM — M48.061 LUMBAR FORAMINAL STENOSIS: Status: ACTIVE | Noted: 2025-04-02

## 2025-04-02 PROBLEM — M54.16 LUMBAR RADICULOPATHY: Status: ACTIVE | Noted: 2025-04-02

## 2025-04-02 PROBLEM — M51.369 BULGE OF LUMBAR DISC WITHOUT MYELOPATHY: Status: ACTIVE | Noted: 2025-04-02

## 2025-04-18 RX ORDER — GABAPENTIN 100 MG/1
100 CAPSULE ORAL 3 TIMES DAILY
Qty: 90 CAPSULE | Refills: 0 | Status: SHIPPED | OUTPATIENT
Start: 2025-04-18

## 2025-04-18 NOTE — TELEPHONE ENCOUNTER
Refill Request    Medication request: gabapentin 100 MG Oral Cap.   Take 1 capsule (100 mg total) by mouth 3 (three) times daily.      LOV:3/4/2025 Behar, Alex, MD   Due back to clinic per last office note:  He will follow-up with me in 2 to 4 weeks.   NOV: 5/6/2025 Behar, Alex, [place]      [place]: 3/27/25 #90 (30 days)    Urine drug screen (if applicable): N/A  Pain contract: N/A    LOV plan (if weaning or changing medications): I will also start him on gabapentin, diclofenac, and he will use Tylenol for pain.

## 2025-05-06 ENCOUNTER — OFFICE VISIT (OUTPATIENT)
Dept: PHYSICAL MEDICINE AND REHAB | Facility: CLINIC | Age: 40
End: 2025-05-06
Payer: COMMERCIAL

## 2025-05-06 VITALS
HEART RATE: 73 BPM | DIASTOLIC BLOOD PRESSURE: 76 MMHG | HEIGHT: 67 IN | WEIGHT: 150 LBS | BODY MASS INDEX: 23.54 KG/M2 | OXYGEN SATURATION: 98 % | SYSTOLIC BLOOD PRESSURE: 108 MMHG

## 2025-05-06 DIAGNOSIS — M51.369 ANNULAR TEAR OF LUMBAR DISC: ICD-10-CM

## 2025-05-06 DIAGNOSIS — R20.0 NUMBNESS IN BOTH HANDS: ICD-10-CM

## 2025-05-06 DIAGNOSIS — M54.12 CERVICAL RADICULOPATHY: ICD-10-CM

## 2025-05-06 DIAGNOSIS — M79.10 MYALGIA: ICD-10-CM

## 2025-05-06 DIAGNOSIS — M54.59 MECHANICAL LOW BACK PAIN: ICD-10-CM

## 2025-05-06 DIAGNOSIS — M47.816 FACET SYNDROME, LUMBAR: ICD-10-CM

## 2025-05-06 DIAGNOSIS — M50.30 DDD (DEGENERATIVE DISC DISEASE), CERVICAL: ICD-10-CM

## 2025-05-06 DIAGNOSIS — M51.372 DEGENERATION OF INTERVERTEBRAL DISC OF LUMBOSACRAL REGION WITH DISCOGENIC BACK PAIN AND LOWER EXTREMITY PAIN: ICD-10-CM

## 2025-05-06 DIAGNOSIS — M54.16 LUMBAR RADICULOPATHY: ICD-10-CM

## 2025-05-06 DIAGNOSIS — M47.812 CERVICAL FACET SYNDROME: ICD-10-CM

## 2025-05-06 DIAGNOSIS — M47.816 LUMBAR SPONDYLOSIS: ICD-10-CM

## 2025-05-06 DIAGNOSIS — M48.061 LUMBAR FORAMINAL STENOSIS: Primary | ICD-10-CM

## 2025-05-06 DIAGNOSIS — M48.02 FORAMINAL STENOSIS OF CERVICAL REGION: ICD-10-CM

## 2025-05-06 DIAGNOSIS — M51.369 BULGE OF LUMBAR DISC WITHOUT MYELOPATHY: ICD-10-CM

## 2025-05-06 DIAGNOSIS — M54.2 TRIGGER POINT OF NECK: ICD-10-CM

## 2025-05-06 PROCEDURE — 3008F BODY MASS INDEX DOCD: CPT | Performed by: PHYSICAL MEDICINE & REHABILITATION

## 2025-05-06 PROCEDURE — 3074F SYST BP LT 130 MM HG: CPT | Performed by: PHYSICAL MEDICINE & REHABILITATION

## 2025-05-06 PROCEDURE — 99214 OFFICE O/P EST MOD 30 MIN: CPT | Performed by: PHYSICAL MEDICINE & REHABILITATION

## 2025-05-06 PROCEDURE — 3078F DIAST BP <80 MM HG: CPT | Performed by: PHYSICAL MEDICINE & REHABILITATION

## 2025-05-06 RX ORDER — DICLOFENAC SODIUM 75 MG/1
75 TABLET, DELAYED RELEASE ORAL 2 TIMES DAILY PRN
COMMUNITY
Start: 2025-05-06

## 2025-05-06 NOTE — PROGRESS NOTES
The following individual(s) verbally consented to be recorded using ambient AI listening technology and understand that they can each withdraw their consent to this listening technology at any point by asking the clinician to turn off or pause the recording:    Patient name: Jose Ramon Huerta  Additional names:

## 2025-05-06 NOTE — PROGRESS NOTES
[facility]  Progress Note    CHIEF COMPLAINT:    Chief Complaint   Patient presents with    Follow - Up     LOV 03/04/2025. Pt here for f/u on L L5/S1 TFESI completed 4/2/25 and reports 90% improvement. Reports occasional sharp pain. C/o neck pain. Reports aching, sharp, dull pain. Pain is 7/10. Denies N/T. Admits weakness. Taking gabapentin, diclofenac, tylenol. Denies PT.         History of Present Illness  Mr. Jose Ramon Huerta is a 40 year old male who presents for follow-up after L5 and S1 transforaminal epidural injection and to discuss ongoing neck pain.    He experienced significant improvement in back and leg pain following the L5 and S1 transforaminal epidural injection on 4/2/2025, combined with gabapentin use, estimating a % improvement in symptoms. Without gabapentin, his pain was at a level of 1, but improved significantly upon resuming the medication. He is currently taking gabapentin 100 mg three times per day and diclofenac twice a day as needed.      He describes ongoing neck pain that radiates down his left arm, sometimes causing numbness in the left middle and ring fingers. The neck pain is usually at a level of 6 or 7 out of 10, making it difficult to turn his neck, especially while driving. He has a known disc herniation with spondylolisthesis, which has shifted forward by two millimeters, potentially irritating the C6 nerve root. He previously received injections for carpal tunnel syndrome in March 2023, which provided relief for six months to a year.    He confirms that the numbness in his left hand is primarily in the middle and ring fingers. He has experienced tingling in the middle finger when certain movements are performed.       PAST MEDICAL HISTORY:  Past Medical History[1]    SURGICAL HISTORY:  Past Surgical History[2]    SOCIAL HISTORY:   Social History     Occupational History    Not on file   Tobacco Use    Smoking status: Never     Smokeless tobacco: Never   Vaping Use    Vaping status: Never Used   Substance and Sexual Activity    Alcohol use: Not Currently     Comment: twice year socially    Drug use: Never    Sexual activity: Not on file       FAMILY HISTORY:   Family History[3]    CURRENT MEDICATIONS:   Current Medications[4]    ALLERGIES:   Allergies[5]    REVIEW OF SYSTEMS:   Review of Systems   Constitutional: Negative.    HENT: Negative.    Eyes: Negative.    Respiratory: Negative.    Cardiovascular: Negative.    Gastrointestinal: Negative.    Genitourinary: Negative.    Musculoskeletal: As per HPI  Skin: Negative.    Neurological: As per HPI  Endo/Heme/Allergies: Negative.    Psychiatric/Behavioral: Negative.      All other systems reviewed and are negative. Pertinent positives and negatives noted in the HPI.    PHYSICAL EXAM:   /76   Pulse 73   Ht 67\"   Wt 150 lb (68 kg)   SpO2 98%   BMI 23.49 kg/m²     Body mass index is 23.49 kg/m².      General: No immediate distress  Head: Normocephalic/ Atraumatic  Eyes: Extra-occular movements intact.   Ears: No auricular hematoma or deformities  Mouth: No lesions or ulcerations  Heart: peripheral pulses intact. Normal capillary refill.   Lungs: Non-labored respirations  Abdomen: No abdominal guarding  Extremities: No lower extremity edema bilaterally   Skin: No lesions noted.   Cognition: alert & oriented x 3, attentive, able to follow 2 step commands, comprehension intact, spontaneous speech intact  Motor:    Musculoskeletal:    CERVICAL SPINE:  Inspection: no erythema, swelling, or obvious deformity  Palpation: Tender to palpation midline cervical spine as well as bilateral cervical facets and paraspinals from C4-C6  ROM: intact to all planes of motion of cervical spine including side-bend bilaterally, rotation bilaterally, flexion, and extension   Strength: 5/5 in all myotomes of the BILATERAL upper extremities   Sensation: Intact to light touch in all dermatomes of the  BILATERAL upper extremities except decrease sensation to light touch at the left middle finger  Reflexes: 2/4 at C5, C6, C7 with a negative Tejada's sign  Spurling Test: negative for radicular symptoms down either extremity bilaterally  Tinel's test: Positive bilaterally at the wrist      Data  Admission on 03/03/2025, Discharged on 03/03/2025   Component Date Value Ref Range Status    Ventricular rate 03/03/2025 84  BPM Final    Atrial rate 03/03/2025 84  BPM Final    P-R Interval 03/03/2025 152  ms Final    QRS Duration 03/03/2025 80  ms Final    Q-T Interval 03/03/2025 332  ms Final    QTC Calculation (Bezet) 03/03/2025 392  ms Final    P Axis 03/03/2025 74  degrees Final    R Axis 03/03/2025 60  degrees Final    T Axis 03/03/2025 67  degrees Final    WBC 03/03/2025 6.8  4.0 - 11.0 x10(3) uL Final    RBC 03/03/2025 5.13  4.30 - 5.70 x10(6)uL Final    HGB 03/03/2025 15.1  13.0 - 17.5 g/dL Final    HCT 03/03/2025 44.1  39.0 - 53.0 % Final    PLT 03/03/2025 191.0  150.0 - 450.0 10(3)uL Final    MCV 03/03/2025 86.0  80.0 - 100.0 fL Final    MCH 03/03/2025 29.4  26.0 - 34.0 pg Final    MCHC 03/03/2025 34.2  31.0 - 37.0 g/dL Final    RDW 03/03/2025 14.0  % Final    Neutrophil Absolute Prelim 03/03/2025 4.73  1.50 - 7.70 x10 (3) uL Final    Neutrophil Absolute 03/03/2025 4.73  1.50 - 7.70 x10(3) uL Final    Lymphocyte Absolute 03/03/2025 1.27  1.00 - 4.00 x10(3) uL Final    Monocyte Absolute 03/03/2025 0.60  0.10 - 1.00 x10(3) uL Final    Eosinophil Absolute 03/03/2025 0.13  0.00 - 0.70 x10(3) uL Final    Basophil Absolute 03/03/2025 0.05  0.00 - 0.20 x10(3) uL Final    Immature Granulocyte Absolute 03/03/2025 0.01  0.00 - 1.00 x10(3) uL Final    Neutrophil % 03/03/2025 69.8  % Final    Lymphocyte % 03/03/2025 18.7  % Final    Monocyte % 03/03/2025 8.8  % Final    Eosinophil % 03/03/2025 1.9  % Final    Basophil % 03/03/2025 0.7  % Final    Immature Granulocyte % 03/03/2025 0.1  % Final    Glucose 03/03/2025 86  70 -  99 mg/dL Final    Sodium 03/03/2025 141  136 - 145 mmol/L Final    Potassium 03/03/2025 4.2  3.5 - 5.1 mmol/L Final    Chloride 03/03/2025 104  98 - 112 mmol/L Final    CO2 03/03/2025 28.0  21.0 - 32.0 mmol/L Final    Anion Gap 03/03/2025 9  0 - 18 mmol/L Final    BUN 03/03/2025 17  9 - 23 mg/dL Final    Creatinine 03/03/2025 1.26  0.70 - 1.30 mg/dL Final    Calcium, Total 03/03/2025 9.3  8.7 - 10.6 mg/dL Final    Calculated Osmolality 03/03/2025 293  275 - 295 mOsm/kg Final    eGFR-Cr 03/03/2025 74  >=60 mL/min/1.73m2 Final    AST 03/03/2025 24  <34 U/L Final    ALT 03/03/2025 19  10 - 49 U/L Final    Alkaline Phosphatase 03/03/2025 70  45 - 117 U/L Final    Bilirubin, Total 03/03/2025 0.2 (L)  0.3 - 1.2 mg/dL Final    Total Protein 03/03/2025 7.3  5.7 - 8.2 g/dL Final    Albumin 03/03/2025 4.5  3.2 - 4.8 g/dL Final    Globulin  03/03/2025 2.8  2.0 - 3.5 g/dL Final    A/G Ratio 03/03/2025 1.6  1.0 - 2.0 Final    Troponin I (High Sensitivity) 03/03/2025 <3  <=53 ng/L Final   Admission on 12/02/2024, Discharged on 12/02/2024   Component Date Value Ref Range Status    Urine Color 12/02/2024 Yellow  Yellow Final    Clarity Urine 12/02/2024 Clear  Clear Final    Spec Gravity 12/02/2024 1.010  1.005 - 1.030 Final    Glucose Urine 12/02/2024 Negative  Negative mg/dL Final    Bilirubin Urine 12/02/2024 Negative  Negative Final    Ketones Urine 12/02/2024 Negative  Negative mg/dL Final    Blood Urine 12/02/2024 Negative  Negative Final    pH Urine 12/02/2024 5.5  5.0 - 8.0 Final    Protein Urine 12/02/2024 Negative  Negative mg/dL Final    Urobilinogen Urine 12/02/2024 0.2  <2.0 mg/dL Final    Nitrite Urine 12/02/2024 Negative  Negative Final    Leukocyte Esterase Urine 12/02/2024 Negative  Negative Final    Microscopic 12/02/2024 Microscopic not indicated   Final   ]      Radiology Imaging:  I reviewed with the patient his MRI of the cervical spine and lumbar spine  MRI SPINE LUMBAR (CPT=72148)  Narrative: PROCEDURE:  MRI  SPINE LUMBAR (CPT=72148)     COMPARISON:  [facility], MR, MRI SPINE CERVICAL (CPT=72141), 8/24/2022, 7:30 PM.     INDICATIONS:  M54.9 Spinal column pain G89.29 Chronic bilateral low back pain without sciatica M54.50 Chronic bilateral low back pain without sciatica M54.2 Cervicalgia     TECHNIQUE:  Multiplanar T1 and T2 weighted images including fat suppression sequences.  Images acquired in sagittal and axial planes.       PATIENT STATED HISTORY: (As transcribed by Technologist)  Patient complains of chronic neck and lower back pain with pain in his arms and bilateral legs           FINDINGS:    LUMBAR DISC LEVELS  L1-L2:  No significant disc/facet abnormality, spinal stenosis, or foraminal narrowing.  L2-L3:  Mild disc desiccation and diffuse disc bulge.  No significant central canal or neural foraminal stenosis.  L3-L4:  No significant disc/facet abnormality, spinal stenosis, or foraminal narrowing.  L4-L5:  No significant disc/facet abnormality, spinal stenosis, or foraminal narrowing.  L5-S1:  Disc desiccation, posterior annular fissuring, mild disc height loss, and small left paracentral focal disc protrusion measuring 9 x 6 x 7 mm.  This superficially contacts the descending left S1 nerve root without displacement (series 1502, image   3).  No significant central canal or neural foraminal stenosis.     PARASPINAL AREA:  Normal with no visible mass.    BONY STRUCTURES:  No fracture, pars defect, significant scoliosis, or osseous lesion.    CORD/CAUDA EQUINA:  Normal caliber, contour, and signal intensity.                     Impression: CONCLUSION:       At the L5-S1 level there is a combination of disc desiccation, posterior annular fissuring, mild disc height loss, and small left paracentral focal disc protrusion.  The disc protrusion superficially contacts the descending left S1 nerve root without   displacement.  No significant central canal or neural foraminal stenosis throughout the lumbar spine.         LOCATION:  [place]        Dictated by (CST): Tico Paez MD on 2/26/2025 at 8:15 AM       Finalized by (CST): Tico Paez MD on 2/26/2025 at 8:22 AM     MRI SPINE CERVICAL (CPT=72141)  Narrative: PROCEDURE:  MRI SPINE CERVICAL (CPT=72141)     COMPARISON:  [facility], , MRI SPINE CERVICAL (CPT=72141), 8/24/2022, 7:30 PM.     INDICATIONS:  M54.9 Spinal column pain G89.29 Chronic bilateral low back pain without sciatica M54.50 Chronic bilateral low back pain without sciatica M54.2 Cervicalgia     TECHNIQUE:  Multiplanar T1 and T2 weighted images including fat suppression sequences.  Images acquired in sagittal and axial planes.       PATIENT STATED HISTORY: (As transcribed by Technologist)  Patient complains of chronic neck and lower back pain with pain in his arms and bilateral legs          FINDINGS:    CERVICAL DISC LEVELS:  C2-C3:  No significant disc/facet abnormality, spinal stenosis, or foraminal narrowing.  C3-C4:  No significant disc/facet abnormality, spinal stenosis, or foraminal narrowing.  C4-C5:  No significant disc/facet abnormality, spinal stenosis, or foraminal narrowing.  C5-C6:  Posterior central disc extrusion eccentric to the left, the extruded disc measuring approximately 9 x 5 mm in cross-section over a 10 mm craniocaudal length (series 702, image 40, series 402, image 9).  This abuts the ventral thecal sac without   significant central canal stenosis.  The extruded disc contributes to mild left foraminal stenosis.  C6-C7:  No significant disc/facet abnormality, spinal stenosis, or foraminal narrowing.  C7-T1:  No significant disc/facet abnormality, spinal stenosis, or foraminal narrowing.     CRANIOCERVICAL AREA:  Normal foramen magnum with no Chiari malformation.    PARASPINAL AREA:  No epidural or paraspinal mass or fluid collection.  Incidentally noted complete opacification of the right maxillary sinus..    BONY STRUCTURES:  No fracture, pars defect, or osseous lesion.    CORD:  Normal  caliber, contour, and signal intensity.                     Impression: CONCLUSION:       1. Posterior central disc extrusion at the C5-6 level contributes to mild left-sided foraminal stenosis.  No significant central canal stenosis at this level or elsewhere within the cervical spine.     2. Normal caliber, contour, and signal intensity of the cervical spinal cord.       3. Complete opacification of the right maxillary sinus.          LOCATION:  [place]        Dictated by (CST): Tico Paez MD on 2/26/2025 at 8:10 AM       Finalized by (CST): Tico Paez MD on 2/26/2025 at 8:14 AM         ASSESSMENT AND PLAN:  Jose Ramon is a pleasant 40-year-old male who presents for follow-up of his low back pain with radiation down the left leg due to a lumbar radiculopathy.  He is status post left L5 and left S1 transforaminal epidural steroid injection on 4/2/2025 with nearly 100% improvement in his symptoms.  Today is most complaining of neck pain with radicular symptoms down the left arm.  His MRI of the cervical spine demonstrates a left paracentral disc herniation at C5-C6 with mild foraminal narrowing and mild central narrowing.  I am recommending a C7-T1 interlaminar epidural steroid injection under local anesthesia with oral sedation.  As a pertains to the numbness and tingling in his hands, I am recommending bilateral carpal tunnel corticosteroid injections under ultrasound guidance as he has a positive Tinel sign with significant improvement following the carpal tunnel injections back in 2023.  I will follow-up with him 2 to 4 weeks after the cervical epidural steroid injection.     Assessment & Plan  Cervical radiculopathy  Cervical radiculopathy with left arm pain and numbness in left middle and ring fingers due to disc herniation and spondylolisthesis affecting C6 nerve root. Pain 6-7/10, impacting daily activities. Previous carpal tunnel injections provided relief, but symptoms returned.  - Perform C7 T1 intralaminar  epidural steroid injection under local anesthesia with oral sedation (Valium).  - Educated about procedure, expected sensations of pressure and tightness in neck, upper back, and chest.  - Continue gabapentin 100 mg three times per day.  - Use diclofenac twice per day only if needed.    Lumbar radiculopathy  Lumbar radiculopathy significantly improved post L5 and S1 transforaminal epidural injection and gabapentin. Pain % improved, occasional mild pain without gabapentin.  - Continue gabapentin 100 mg three times per day.  - Use diclofenac twice per day only if needed.    Carpal tunnel syndrome  Bilateral carpal tunnel syndrome with previous injections providing significant relief. Current numbness in left middle and ring fingers possibly related to cervical radiculopathy or carpal tunnel syndrome.  - Perform bilateral carpal tunnel corticosteroid injections under ultrasound guidance.      RTC in 2 to 4 weeks after procedures  Discharge Instructions were provided as documented in AVS summary.  The patient was in agreement with the assessment and plan.  All questions were answered.  There were no barriers to learning.         1. Lumbar foraminal stenosis    2. Lumbar radiculopathy    3. Mechanical low back pain    4. Facet syndrome, lumbar    5. Lumbar spondylosis    6. Degeneration of intervertebral disc of lumbosacral region with discogenic back pain and lower extremity pain    7. Bulge of lumbar disc without myelopathy    8. Annular tear of lumbar disc    9. Trigger point of neck    10. Cervical facet syndrome    11. Cervical radiculopathy    12. Foraminal stenosis of cervical region    13. DDD (degenerative disc disease), cervical    14. Myalgia    15. Numbness in both hands        Alex B. Behar MD  Physical Medicine and Rehabilitation/Sports Medicine  [facility]  21st Century Cures Act Notice to Patient: Medical documents like this are made available to patients in the interest of  transparency. However, be advised this is a medical document and it is intended as [id number] communication between your medical providers. This medical document may contain abbreviations, assessments, medical data, and results or other terms that are unfamiliar. Medical documents are intended to carry relevant information, facts as evident, and the clinical opinion of the practitioner. As such, this medical document may be written in language that appears blunt or direct. You are encouraged to contact your medical provider and/or [facility] Patient Experience if you have any questions about this medical document.   Abridge tool was used for dictation purposes only and the patient was not recorded at any point during the visit.              [1]   Past Medical History:   ADHD    Allergic rhinitis    Anxiety    Was on and off but now it is more frequent    Arthritis    Asthma (HCC)    Back pain    Depression    Loss of baby    Gout    Migraines    Polyarthralgia    Reactive depression   [2]   Past Surgical History:  Procedure Laterality Date    Appendectomy  2019    Other surgical history  02/01/2020    right hand surgery   [3]   Family History  Problem Relation Age of Onset    Other (Other) Father     Heart Disease Father     Anemia Mother     Anxiety Mother     Personality Disorder Mother     No Known Problems Maternal Grandmother     No Known Problems Maternal Grandfather     No Known Problems Paternal Grandmother     No Known Problems Paternal Grandfather    [4]   Current Outpatient Medications   Medication Sig Dispense Refill    diclofenac 75 MG Oral Tab EC Take 1 tablet (75 mg total) by mouth 2 (two) times daily as needed. Take with food for 2 weeks as directed and then as needed.      amphetamine-dextroamphetamine (ADDERALL) 30 MG Oral Tab Take 1 tablet (30 mg total) by mouth daily. 30 tablet 0    GABAPENTIN 100 MG Oral Cap TAKE 1 CAPSULE(100 MG) BY MOUTH THREE TIMES DAILY 90 capsule 0    sertraline  (ZOLOFT) 100 MG Oral Tab Take 2 tablets (200 mg total) by mouth daily. 180 tablet 1    clonazePAM 2 MG Oral Tab Take 1 tablet (2 mg total) by mouth 3 (three) times daily. 90 tablet 2    amphetamine-dextroamphetamine ER (ADDERALL XR) 30 MG Oral Capsule SR 24 Hr Take 1 capsule (30 mg total) by mouth daily. 30 capsule 0   [5]   Allergies  Allergen Reactions    Penicillins HIVES

## 2025-05-06 NOTE — PATIENT INSTRUCTIONS
1) Continue Gabapentin 100 mg three times per day  2) Use Diclofenac 1-2 times per day if needed  3) My office will call you to schedule the C7-T1 ILESI under local and oral sedation once the procedure is approved by your insurance carrier.    4) My office will call you to schedule the BILATERAl carpal tunnel CSI under US once the procedure is approved by your insurance carrier.    5) Follow up with me 2-4 weeks after the procedure. This can be in the office or virtually.

## 2025-05-07 ENCOUNTER — TELEPHONE (OUTPATIENT)
Dept: PHYSICAL MEDICINE AND REHAB | Facility: CLINIC | Age: 40
End: 2025-05-07

## 2025-05-07 DIAGNOSIS — M54.12 CERVICAL RADICULOPATHY: Primary | ICD-10-CM

## 2025-05-07 NOTE — TELEPHONE ENCOUNTER
Patient has been scheduled for C7-T1 Interlaminar Epidural Steroid Injection on 5/14/2025 at the [facility] with Dr. Behar.   Anesthesia type:  Oral sedation- Patient has been informed a  is required and Valium is provided at [facility].  Please note: The [facility] will call the business day prior to discuss the exact time/arrival and additional instructions for your appointment.  Patient was advised that if he/she does receive the covid vaccine it needs to be at least 2 weeks before or after the injection.  Medications and allergies reviewed.  Educated to hold NSAIDS (Aleve, Ibuprofen, Motrin, Advil) and anti-inflammatories (Meloxicam, Naproxen, Diclofenac, Celebrex) and for cervical injections must hold Multi-Vitamins, Vitamin E, Fish Oil/Omega-3.  Patient informed of [facility]'s  policy:  The patient will require transportation arrangements to and from the procedure, with the  present on site for the entire visit.  Without a , the appointment is subject to cancellation.    [facility] is located in the [facility] 1st floor [address] [place] [address].   may park in the yellow/purple parking lot.  Patient verbalized understanding and agrees with plan.  Scheduled in Epic: Yes  Scheduled in Surgical Case: Yes  Follow up appointment made: NOV: 6/18/2025 Behar, Alex, MD- bilateral carpal tunnel  Authorization date valid until 8/7/2025 at [facility].

## 2025-05-07 NOTE — TELEPHONE ENCOUNTER
Initiated authorization for Bilateral carpal tunnel CSI under . CPT/HCPCS 49584-56,  x's 2, 00582 dx:M79.10 with [employer] portal.    Status: No action required  Reference/Authorization # [id number]  Valid: 5/7/25-8/7/25  Authorization is not required based on medical necessity, however, is not a guarantee of payment and may be subject to review once claim is submitted.         AND    Initiated authorization for C7-T1 ILESI under local anesthesia and oral sedation . CPT/HCPCS 78865 dx:M54.12 [facility]    Status: Approved  Reference/Authorization # [id number]  Valid: 05/07/2025 - 06/05/2025  Authorization is not a guarantee of payment and may be subject to review once claim is submitted.

## 2025-05-14 PROBLEM — M48.02 FORAMINAL STENOSIS OF CERVICAL REGION: Status: ACTIVE | Noted: 2025-05-14

## 2025-05-14 PROBLEM — M54.12 CERVICAL RADICULOPATHY: Status: ACTIVE | Noted: 2025-05-14

## 2025-05-28 RX ORDER — GABAPENTIN 100 MG/1
100 CAPSULE ORAL 3 TIMES DAILY
Qty: 90 CAPSULE | Refills: 0 | Status: SHIPPED | OUTPATIENT
Start: 2025-05-28

## 2025-05-28 NOTE — TELEPHONE ENCOUNTER
Refill Request    LOV:5/6/2025 Behar, Alex, MD   Due back to clinic per last office note:  2-4 weeks after procuders  NOV: 6/18/2025 Behar, Alex, MD     Urine drug screen (if applicable): n/a  Pain contract: n/a     Medication request:   Requested Prescriptions     Pending Prescriptions Disp Refills    GABAPENTIN 100 MG Oral Cap [Pharmacy Med Name: GABAPENTIN 100MG CAPSULES] 90 capsule 0     Sig: TAKE 1 CAPSULE(100 MG) BY MOUTH THREE TIMES DAILY           [place]: 4/26/25 #90 - 30 days supply    LOV plan (if weaning or changing medications): Per Dr. Behar, \"Continue gabapentin 100 mg three times per day.\"

## 2025-07-01 RX ORDER — GABAPENTIN 100 MG/1
100 CAPSULE ORAL 3 TIMES DAILY
Qty: 90 CAPSULE | Refills: 0 | Status: SHIPPED | OUTPATIENT
Start: 2025-07-01

## 2025-07-01 NOTE — TELEPHONE ENCOUNTER
Refill Request    Medication request: GABAPENTIN 100 MG Oral Cap     LOV:5/6/2025 Behar, Alex, MD   Due back to clinic per last office note:  \"Follow up with me 2-4 weeks after the procedure.\"  NOV: Visit date not found      [place]: 05/29/2025 #90 (30 day supply)    Urine drug screen (if applicable): n/a  Pain contract: n/a    LOV plan (if weaning or changing medications): \"Continue gabapentin 100 mg three times per day.\"

## 2025-07-15 ENCOUNTER — PATIENT MESSAGE (OUTPATIENT)
Dept: PHYSICAL MEDICINE AND REHAB | Facility: CLINIC | Age: 40
End: 2025-07-15

## 2025-07-30 RX ORDER — GABAPENTIN 100 MG/1
100 CAPSULE ORAL 3 TIMES DAILY
Qty: 90 CAPSULE | Refills: 0 | Status: SHIPPED | OUTPATIENT
Start: 2025-07-30

## 2025-08-28 RX ORDER — GABAPENTIN 100 MG/1
100 CAPSULE ORAL 3 TIMES DAILY
Qty: 90 CAPSULE | Refills: 0 | Status: SHIPPED | OUTPATIENT
Start: 2025-08-28

## (undated) NOTE — LETTER
Date: 2023      Patient Name: Enid Reza      : 1985        Thank you for choosing  [facility] as your health care provider. Your physician has deemed the following medical service(s) necessary. However, your insurance plan may not pay for all of your health care and costs and may deny payment for this service. The fact that your insurance plan does not pay for an item or service does not mean you should not receive it. The purpose of this form is to help you make an informed decision about whether or not you want to receive this service(s) that may not be paid for by your insurance plan. CPT Code Description     Cost     BILATERAL carpal tunnel CSI under ultrasound guidance CPT 20526x2, , 31571  $2400.00      I understand that the above mentioned service(s) or supply may not be covered by my insurance company.  I agree to be financially responsible for the cost of this service or supply in the event of my insurance denies payment as a non-covered benefit.        ______________________________________________________________________  Signature of Patient or Patient's Representative  Relationship  Date    ______________________________________________________________________  Signature of Witness to signing of form   Printed Name

## (undated) NOTE — LETTER
03/25/19        Janneth Pedersen [place] [address]      Dear Michelle Jules,    [facility] records indicate that you have outstanding lab work and or testing that was ordered for you and has not yet been completed:  Orders Placed This Encounter      F

## (undated) NOTE — LETTER
Date & Time: 2/15/2023, 4:51 PM  Patient: Shahrzad Christiansen  Encounter Provider(s):    Avanell Ahumada, APRN       To Whom It May Concern:    Sebastian Alvarenga was seen and treated in our department on 2/15/2023. He should not return to work until 2/17/23. .    If you have any questions or concerns, please do not hesitate to call.        _____________________________  Physician/APC Signature

## (undated) NOTE — LETTER
AUTHORIZATION FOR SURGICAL OPERATION OR OTHER PROCEDURE    1. I hereby authorize Dr. Elke Collazo and the [facility] staff assigned to my case to perform the following operation and/or procedure at the [facility]:    BILATERAL carpal tunnel CSI under ultrasound guidance CPT 20526x2, , [id number]    2. My physician has explained the nature and purpose of the operation or other procedure, possible alternative methods of treatment, the risks involved, and the possibility of complication to me. I acknowledge that no guarantee has been made as to the result that may be obtained. 3.  I recognize that, during the course of this operation, or other procedure, unforseen conditions may necessitate additional or different procedure than those listed above. I, therefore, further authorize and request that the above named physician, his/her physician assistants or designees perform such procedures as are, in his/her professional opinion, necessary and desirable. 4.  Any tissue or organs removed in the operation or other procedure may be disposed of by and at the discretion of the [facility] staff and [facility]. 5.  I understand that in the event of a medical emergency, I will be transported by local paramedics to [facility] or other hospital emergency department. 6.  I certify that I have read and fully understand the above consent to operation and/or other procedure. 7.  I acknowledge that my physician has explained sedation/analgesia administration to me including the risks and benefits. I consent to the administration of sedation/analgesia as may be necessary or desirable in the judgement of my physician. Witness signature: ___________________________________________________ Date:  ______/______/_____                    Time:  ________ A. M.  P.M.        Patient Name:  Rufina Butcher  4/21/1985  [id number]         Patient signature: ___________________________________________________                 Statement of Physician  My signature below affirms that prior to the time of the procedure, I have explained to the patient and/or his/her guardian, the risks and benefits involved in the proposed treatment and any reasonable alternative to the proposed treatment. I have also explained the risks and benefits involved in the refusal of the proposed treatment and have answered the patient's questions.                         Date:  ______/______/_______  Provider                      Signature:  __________________________________________________________       Time:  ___________ AALEXI ROSENTHAL